# Patient Record
Sex: FEMALE | Race: WHITE
[De-identification: names, ages, dates, MRNs, and addresses within clinical notes are randomized per-mention and may not be internally consistent; named-entity substitution may affect disease eponyms.]

---

## 2017-02-28 ENCOUNTER — HOSPITAL ENCOUNTER (EMERGENCY)
Dept: HOSPITAL 17 - NETRI | Age: 42
Discharge: HOME | End: 2017-02-28
Payer: COMMERCIAL

## 2017-02-28 VITALS
DIASTOLIC BLOOD PRESSURE: 68 MMHG | HEART RATE: 78 BPM | RESPIRATION RATE: 17 BRPM | OXYGEN SATURATION: 96 % | TEMPERATURE: 98 F | SYSTOLIC BLOOD PRESSURE: 147 MMHG

## 2017-02-28 VITALS — WEIGHT: 209.44 LBS | HEIGHT: 62 IN | BODY MASS INDEX: 38.54 KG/M2

## 2017-02-28 DIAGNOSIS — R06.2: ICD-10-CM

## 2017-02-28 DIAGNOSIS — J40: Primary | ICD-10-CM

## 2017-02-28 DIAGNOSIS — M32.9: ICD-10-CM

## 2017-02-28 DIAGNOSIS — J02.9: ICD-10-CM

## 2017-02-28 PROCEDURE — 71020: CPT

## 2017-02-28 PROCEDURE — 99283 EMERGENCY DEPT VISIT LOW MDM: CPT

## 2017-02-28 NOTE — RADRPT
EXAM DATE/TIME:  02/28/2017 13:03 

 

[facility] COMPARISON:     

CHEST PA & LAT, January 12, 2015, 12:19.

 

                     

INDICATIONS :     

Congestion, wheezing, short of breath.

                     

 

MEDICAL HISTORY :     

None.          

 

SURGICAL HISTORY :     

None.   

 

ENCOUNTER:     

Initial                                        

 

ACUITY:     

1 week      

 

PAIN SCORE:     

3/10

 

LOCATION:     

Bilateral chest 

 

FINDINGS:     

PA and lateral views of the chest demonstrate the lungs to be symmetrically aerated without evidence 
of mass, infiltrate or effusion.  The cardiomediastinal contours are unremarkable.  Osseous structure
s are intact.

 

CONCLUSION:     Normal examination.  

 

 

 

 Dread Landry MD on February 28, 2017 at 13:07           

Board Certified Radiologist.

 This report was verified electronically.

## 2017-02-28 NOTE — PD
HPI


Chief Complaint:  Cold / Flu Symptoms


Time Seen by Provider:  12:28


Travel History


International Travel<30 days:  No


Contact w/Intl Traveler<30days:  No


Traveled to known affect area:  No





History of Present Illness


HPI


41-year-old female who reports a history of lupus, hypothyroidism, depression 

and mild asthma.  She presents here for evaluation of cough, congestion, sore 

throat.  Symptoms started 1 week ago.  The cough is productive with green 

sputum production.  She does also endorse some wheezing, particularly at night.

  She is not using any medication for symptom relief.  No recent travel, no 

recent sick contacts.  No fevers or chills.  She has no other complaints at 

this time.





PFSH


Past Medical History


Autoimmune Disease:  Yes (LUPUS)


Heart Rhythm Problems:  No


Cancer:  No


Cardiac Catheterization:  No


Cardiovascular Problems:  Yes (HEART MURMUR)


High Cholesterol:  No


Congestive Heart Failure:  No


Diabetes:  No


Diminished Hearing:  No


Endocrine:  Yes


GERD:  Yes


Genitourinary:  No


Hepatitis:  No


Hiatal Hernia:  No


Hypertension:  No


Immune Disorder:  Yes (LUPUS)


Musculoskeletal:  Yes (HX OF COSTOCHONDRITIS)


Neurologic:  No


Psychiatric:  Yes (ANXIETY AND PANIC ATTACKS. NO MEDS USED)


Reproductive:  No


Respiratory:  Yes (HX OF ASTHMA)


Immunizations Current:  Yes


Thyroid Disease:  Yes (HYPOTHYROID)


Pregnant?:  Not Pregnant


LMP:  2017





Past Surgical History


Abdominal Surgery:  Yes (C- SECTION X 2;)


Body Medical Devices:  N/A


Cardiac Surgery:  No


 Section:  Yes (X2)


Coronary Artery Bypass Graft:  No


Ear Surgery:  No


Endocrine Surgery:  No


Eye Surgery:  No


Genitourinary Surgery:  No


Gynecologic Surgery:  Yes ( X2)


Joint Replacement:  No


Oral Surgery:  Yes (CROWNS)


Pacemaker:  No


Thoracic Surgery:  No





Social History


Alcohol Use:  Yes (SOCIALLY)


Tobacco Use:  No


Substance Use:  No





Allergies-Medications


(Allergen,Severity, Reaction):  


Coded Allergies:  


     Penicillin (Verified  Allergy, Severe, RASH, 17)


Reported Meds & Prescriptions





Reported Meds & Active Scripts


Active


Tessalon Perles (Benzonatate) 100 Mg Cap 100 Mg PO TID PRN


Proair Hfa 8.5 GM Inh (Albuterol Sulfate) 90 Mcg/Act Aer 2 Puff INH Q4-6H PRN


     108 mcg/actuation


Prednisone 20 Mg Tab 20 Mg PO BID 5 Days


Buspirone (Buspirone HCl) 10 Mg Tab 20 Mg PO BID


Zoloft (Sertraline HCl) 50 Mg Tab 50 Mg PO DAILY


Augmentin (Amoxicillin-Clavulanate) 875-125 mg Tab 875 Mg PO BID


     not for use in CrCl <30 ml/min.


Reported


Synthroid (Levothyroxine Sodium) 75 Mcg Tab 75 Mcg PO DAILY


Ibuprofen 800 Mg Tab 800 Mg PO TID








Review of Systems


Except as stated in HPI:  all other systems reviewed are Neg





Physical Exam


Narrative


GENERAL: Well-developed well-nourished female in no acute distress


SKIN: Warm and dry.


HEAD: Atraumatic. Normocephalic. 


EYES: Pupils equal and round. No scleral icterus. No injection or drainage. 


ENT: No nasal bleeding or discharge.  Mucous membranes pink and moist.  No 

oropharyngeal erythema or exudate.  Tympanic membranes revealed normal 

anatomical landmarks without erythema or air fluid level.


NECK: Trachea midline. No JVD.  No lymphadenopathy.


CARDIOVASCULAR: Regular rate and rhythm.  No murmur appreciated.


RESPIRATORY: No accessory muscle use. Clear to auscultation. Breath sounds 

equal bilaterally.  No crackles no wheezing or rhonchi





Data


Data


Last Documented VS





Vital Signs








  Date Time  Temp Pulse Resp B/P Pulse Ox O2 Delivery O2 Flow Rate FiO2


 


17 12:24 98.0 78 17 147/68 96   








Orders





 Chest, Pa & Lat (17 )








[facility]


Medical Decision Making


Medical Screen Exam Complete:  Yes


Emergency Medical Condition:  Yes


Medical Record Reviewed:  Yes


Differential Diagnosis


Bronchitis, sinusitis, pneumonia, influenza, reactive airway disease, asthma 

exacerbation


Narrative Course


41-year-old female with 1 week of cough, congestion, sore throat, wheezing at 

night.  Physical examination currently is reassuring.  She has no fever, 

tachycardia, tachypnea, hypoxia, wheezing on examination.  She appears well.  

She is concerned that she may have pneumonia which she has had in the past.  

Therefore Plan is for chest x-ray.  Chest x-ray is negative for pneumonia.  The 

patient appears to have a viral bronchitis.  She will be treated with 

bronchodilators, prednisone, Tessalon.





Diagnosis





 Primary Impression:  


 Bronchitis





***Additional Instructions:


Medication as prescribed.  Cover mouth when coughing.  Stay well hydrated and 

well-nourished.  Return for any emergent medical conditions.


***Med/Other Pt SpecificInfo:  Prescription(s) given


Scripts


Benzonatate (Tessalon Perles)100 Mg Aff745 Mg PO TID PRN (COUGH) #30 CAP  Ref 0


   Prov:Dread Mann MD         17 


Albuterol 8.5 GM Inh (Proair Hfa 8.5 GM Inh)90 Mcg/Act Aer2 Puff INH Q4-6H PRN (

SHORTNESS OF BREATH) #1 INHALER  Ref 0


   108 mcg/actuation


   Prov:Dread Mann MD         17 


Prednisone 20 Mg Tab20 Mg PO BID  5 Days  Ref 0


   Prov:Dread Mann MD         17


Disposition:  01 DISCHARGE HOME


Condition:  Stable








Jung Frausto 2017 12:36

## 2017-04-25 ENCOUNTER — HOSPITAL ENCOUNTER (EMERGENCY)
Dept: HOSPITAL 17 - NEPK | Age: 42
Discharge: HOME | End: 2017-04-25
Payer: COMMERCIAL

## 2017-04-25 VITALS
OXYGEN SATURATION: 100 % | TEMPERATURE: 98.4 F | DIASTOLIC BLOOD PRESSURE: 88 MMHG | RESPIRATION RATE: 16 BRPM | HEART RATE: 74 BPM | SYSTOLIC BLOOD PRESSURE: 141 MMHG

## 2017-04-25 VITALS — HEIGHT: 67 IN | BODY MASS INDEX: 34.26 KG/M2 | WEIGHT: 218.26 LBS

## 2017-04-25 DIAGNOSIS — S29.012A: ICD-10-CM
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
## 2017-04-25 DIAGNOSIS — S16.1XXA: Primary | ICD-10-CM

## 2017-04-25 PROCEDURE — 99282 EMERGENCY DEPT VISIT SF MDM: CPT

## 2017-04-25 NOTE — PD
HPI


Chief Complaint:  Back/ Neck Pain or Injury


Time Seen by Provider:  21:07


Travel History


International Travel<30 days:  No


Contact w/Intl Traveler<30days:  No


Traveled to known affect area:  No





History of Present Illness


HPI


41-year-old white female presents to emergency Department with complaints of 

neck and upper back pain after a slip and fall at home.  She states that this 

occurred a few days ago.  She had tripped over her daughter's bed falling flat 

onto her back.  She did not hit her head.  There was no loss of consciousness.  

She denies any focal numbness, tingling or weakness.  No acute bowel or bladder 

changes.  She states that she's developed increasing stiffness and pain worse 

with movement of the head.  She states the pain radiates from her base of her 

head down into her neck and upper back.  She has some relief with remaining 

still but worse with activity.  Denies any recent illness.  Denies pregnancy





PFSH


Past Medical History


*** Narrative Medical


Lupus, anxiety, depression, hypothyroidism


Autoimmune Disease:  Yes (LUPUS)


Heart Rhythm Problems:  No


Cancer:  No


Cardiac Catheterization:  No


Cardiovascular Problems:  Yes (HEART MURMUR)


High Cholesterol:  No


Congestive Heart Failure:  No


Diabetes:  No


Diminished Hearing:  No


Endocrine:  Yes


GERD:  Yes


Genitourinary:  No


Hepatitis:  No


Hiatal Hernia:  No


Hypertension:  No


Immune Disorder:  Yes (LUPUS)


Musculoskeletal:  Yes (HX OF COSTOCHONDRITIS)


Neurologic:  No


Psychiatric:  Yes (ANXIETY AND PANIC ATTACKS. NO MEDS USED)


Reproductive:  No


Respiratory:  Yes (HX OF ASTHMA)


Immunizations Current:  Yes


Thyroid Disease:  Yes (HYPOTHYROID)





Past Surgical History


*** Narrative Surgical


C-sections, left carpal tunnel release


Abdominal Surgery:  Yes (C- SECTION X 2;)


Body Medical Devices:  N/A


Cardiac Surgery:  No


 Section:  Yes (X2)


Coronary Artery Bypass Graft:  No


Ear Surgery:  No


Endocrine Surgery:  No


Eye Surgery:  No


Genitourinary Surgery:  No


Gynecologic Surgery:  Yes ( X2)


Joint Replacement:  No


Oral Surgery:  Yes (CROWNS)


Pacemaker:  No


Thoracic Surgery:  No





Social History


Alcohol Use:  Yes (SOCIALLY)


Tobacco Use:  No


Substance Use:  No





Allergies-Medications


(Allergen,Severity, Reaction):  


Coded Allergies:  


     Penicillin (Verified  Allergy, Severe, RASH, 17)


Reported Meds & Prescriptions





Reported Meds & Active Scripts


Active


Robaxin (Methocarbamol) 750 Mg Tab 1,500 Mg PO TID 7 Days


Diclofenac Sodium DR (Diclofenac Sodium) 75 Mg Tabdr 75 Mg PO BID


Proair Hfa 8.5 GM Inh (Albuterol Sulfate) 90 Mcg/Act Aer 2 Puff INH Q4-6H PRN


     108 mcg/actuation


Buspirone (Buspirone HCl) 10 Mg Tab 20 Mg PO BID


Zoloft (Sertraline HCl) 50 Mg Tab 50 Mg PO DAILY


Reported


Omega 3 1000 mg (Omega-3 Fatty Acids) 1 Cap Cap   


Curcumin (Turmeric (Curcuma Longa) (Bulk) 1 Pow Pow   


Vitamin D (Cholecalciferol) 5,000 Unit Tab   


Synthroid (Levothyroxine Sodium) 75 Mcg Tab 75 Mcg PO DAILY


Ibuprofen 800 Mg Tab 800 Mg PO TID








Review of Systems


Except as stated in HPI:  all other systems reviewed are Neg





Physical Exam


Narrative


GENERAL:  Well-developed, well-nourished in no apparent distress. Nontoxic 

appearing.


HEAD: Normocephalic, atraumatic.


EYES: Pupils equal round and reactive. Extraocular motions intact. No scleral 

icterus. No injection or drainage. 


ENT: Nose clear. Throat without erythema, tonsillar hypertrophy or exudate. 

Uvula midline. Airway patent.


NECK: Trachea midline. Supple, paraspinal muscle tenderness but no central bony 

tenderness., patient moves head slowly due to pain.  She has slightly limited 

range of motion..  Mild spasm.


CARDIOVASCULAR: Regular rate and rhythm without murmurs, gallops, or rubs. 


RESPIRATORY: Clear to auscultation. Breath sounds equal bilaterally. No wheezes

, rales, or rhonchi.  


GASTROINTESTINAL: Abdomen soft, non-tender, nondistended. No hepato-splenomegaly

, or palpable masses. No guarding.


EXTREMITIES: No clubbing, cyanosis, or edema. No joint tenderness.


BACK: No central bony tenderness.  Without deformity. No flank tenderness.  

Patient complains of myofascial tenderness between the scapulas bilaterally.  

She has parathoracic muscle tenderness with mild spasm.  She has no pain in the 

lower lumbar spine.  Full range of motion.  Neurovascularly intact distally.  

No saddle anesthesia.


NEUROLOGICAL: Awake, alert and oriented x 3 .Cranial nerves grossly intact.  

Motor and sensory grossly within normal limits. Normal speech.





Data


Data


Last Documented VS





Vital Signs








  Date Time  Temp Pulse Resp B/P Pulse Ox O2 Delivery O2 Flow Rate FiO2


 


17 20:45 98.4 74 16 141/88 100   











MDM


Medical Decision Making


Medical Screen Exam Complete:  Yes


Emergency Medical Condition:  Yes


Medical Record Reviewed:  Yes


Differential Diagnosis


MDM: High


Differential diagnoses: Fracture, sprain, strain, dislocation, contusion, 

neurovascular injury


Narrative Course


Patient's exam is reassuring.  I do not believe that imaging is indicated at 

this time.  The patient will be treated with muscle relaxer and NSAID.





This is acute cervical and thoracic strain status post fall





Diagnosis





 Primary Impression:  


 acute cervical and thoracic strain status post fall


Patient Instructions:  General Instructions





***Additional Instructions:


Rest.


Ice or heat whichever seems to help her pain the past.


Robaxin and Voltaren.


Follow-up with a primary care doctor in one week.


Return to the ER for emergencies.


***Med/Other Pt SpecificInfo:  Prescription(s) given


Scripts


Methocarbamol (Robaxin)750 Mg Tab1,500 Mg PO TID  7 Days


   Prov:Arturo Lafleur MD         17 


Diclofenac Sodium DR 75 Mg Tabdr75 Mg PO BID  #20 TAB


   Prov:Arturo Lafleur MD         17


Disposition:  01 DISCHARGE HOME


Condition:  Stable








En Vasquez 2017 21:12

## 2017-05-07 ENCOUNTER — HOSPITAL ENCOUNTER (EMERGENCY)
Dept: HOSPITAL 17 - PHED | Age: 42
LOS: 1 days | Discharge: HOME | End: 2017-05-08
Payer: COMMERCIAL

## 2017-05-07 VITALS — HEIGHT: 62 IN | WEIGHT: 211.42 LBS | BODY MASS INDEX: 38.91 KG/M2

## 2017-05-07 DIAGNOSIS — J32.9: ICD-10-CM

## 2017-05-07 DIAGNOSIS — M32.9: ICD-10-CM

## 2017-05-07 DIAGNOSIS — E03.9: ICD-10-CM

## 2017-05-07 DIAGNOSIS — H10.9: Primary | ICD-10-CM

## 2017-05-07 PROCEDURE — 99283 EMERGENCY DEPT VISIT LOW MDM: CPT

## 2017-05-08 VITALS
TEMPERATURE: 98 F | SYSTOLIC BLOOD PRESSURE: 122 MMHG | RESPIRATION RATE: 14 BRPM | HEART RATE: 71 BPM | OXYGEN SATURATION: 97 % | DIASTOLIC BLOOD PRESSURE: 87 MMHG

## 2017-05-08 NOTE — PD
HPI


Chief Complaint:  Cold / Flu Symptoms


Time Seen by Provider:  00:23


Travel History


International Travel<30 days:  No


Contact w/Intl Traveler<30days:  No


Traveled to known affect area:  No





History of Present Illness


HPI


42-year-old woman presents to the emergency department complaining of increased 

sinus pressure over the past 3 days, cough productive of thick phlegm, now with 

right eye irritation and inflammation redness purulent drainage and fullness.  

She has pain behind the eye, and fullness around the eye.  No fevers or chills.

  She is a history of lupus but is not on any medication for now.  She 

otherwise has been feeling generally well and healthy.





History


Past Medical History


*** Narrative Medical


Lupus


Hypothyroidism


Influenza Vaccination:  No


LMP:  17


:  2


Para:  2





Social History


Alcohol Use:  Yes (SOCIALLY)


Tobacco Use:  No





Allergies-Medications


(Allergen,Severity, Reaction):  


Coded Allergies:  


     Penicillin (Verified  Allergy, Severe, RASH, 17)


Reported Meds & Prescriptions





Reported Meds & Active Scripts


Active


Proair Hfa 8.5 GM Inh (Albuterol Sulfate) 90 Mcg/Act Aer 2 Puff INH Q4-6H PRN


     108 mcg/actuation


Buspirone (Buspirone HCl) 10 Mg Tab 20 Mg PO BID


Zoloft (Sertraline HCl) 50 Mg Tab 50 Mg PO DAILY


Reported


Synthroid (Levothyroxine Sodium) 75 Mcg Tab 75 Mcg PO DAILY








Review of Systems


Except as stated in HPI:  all other systems reviewed are Neg





Physical Exam


Narrative


GENERAL: Well-appearing 42-year-old woman, no acute distress.


SKIN: Focused skin assessment warm/dry.


HEAD: Atraumatic. Normocephalic. 


EYES: Right eye is matted shut with purulent drainage, there is erythema and 

injection of the eye itself.  There is a little bit of photophobia but no 

consensual photophobia.  I don't see any hyphema.  Woods lamp exam was negative 

for any abnormal areas of uptake.  Intraocular pressures were about 17-20 in 

the right, 13 in the left.


ENT: No nasal bleeding or discharge.  Mucous membranes pink and moist.  TMs 

normal.  Throat normal.


NECK: Trachea midline. No JVD.  No meningismus.


CARDIOVASCULAR: Regular rate and rhythm.  No murmur appreciated.


RESPIRATORY: No accessory muscle use. Clear to auscultation. Breath sounds 

equal bilaterally. 


GASTROINTESTINAL: Abdomen soft, non-tender, nondistended. Hepatic and splenic 

margins not palpable. 


MUSCULOSKELETAL: No obvious deformities. No clubbing.  No cyanosis.  No edema. 


NEUROLOGICAL: Awake and alert. No obvious cranial nerve deficits.  Motor 

grossly within normal limits. Normal speech.


PSYCHIATRIC: Appropriate mood and affect; insight and judgment normal.





Data


Data


Last Documented VS





Vital Signs








  Date Time  Temp Pulse Resp B/P Pulse Ox O2 Delivery O2 Flow Rate FiO2


 


17 00:01 98.0 71 14 122/87 97   








Orders





 Proparacaine 0.5% Opth Soln (Alcaine 0.5 (17 00:45)


Erythromycin 0.5% Opth Oint (Ilotycin 0. (17 01:15)


Clindamycin (Cleocin) (17 01:15)








[facility]


Medical Decision Making


Medical Screen Exam Complete:  Yes


Emergency Medical Condition:  Yes


Differential Diagnosis


Sinusitis, orbital cellulitis, retro-orbital cellulitis, dural vein thrombosis, 

iritis or anterior uveitis, other


Narrative Course


Medical decision-making 





42-year-old woman with sinus pressure and conjunctivitis symptoms.  She is a 

little bit more photophobia and pain in the abdomen or expected for straight 

for conjunctivitis.  Don't see any other evidence of iritis or more sinister 

eye disease.  I don't think she has retro-orbital cellulitis.  Recommend oral 

antibiotics, antibiotic ointment, and return for any worsening symptoms.  She 

is agreeable.





Diagnosis





 Primary Impression:  


 Sinusitis


 Additional Impression:  


 Conjunctivitis





***Additional Instructions:


Continue clindamycin as prescribed for sinusitis.





Continue with him eyes normal as prescribed first conjunctivitis.





Return the emergency department for any worsening eye pain, swelling, fevers, 

or any other new or worsening symptoms.


***Med/Other Pt SpecificInfo:  Prescription(s) given


Scripts


Erythromycin Opth Oint 5 Mg/Gm Oint1 Applic RIGHT EYE QID  #1 TUBE


   Prov:Dread Mann MD         17 


Clindamycin 300 Mg [id number] Mg PO Q6H  10 Days


   Prov:Dread Mann MD         17


Disposition:  01 DISCHARGE HOME


Condition:  Stable








Dread Mann MD May 8, 2017 01:19

## 2017-05-29 ENCOUNTER — HOSPITAL ENCOUNTER (EMERGENCY)
Dept: HOSPITAL 17 - PHED | Age: 42
LOS: 1 days | Discharge: HOME | End: 2017-05-30
Payer: COMMERCIAL

## 2017-05-29 VITALS
DIASTOLIC BLOOD PRESSURE: 79 MMHG | OXYGEN SATURATION: 95 % | HEART RATE: 80 BPM | RESPIRATION RATE: 16 BRPM | SYSTOLIC BLOOD PRESSURE: 132 MMHG

## 2017-05-29 VITALS — WEIGHT: 215.39 LBS | HEIGHT: 62 IN | BODY MASS INDEX: 39.64 KG/M2

## 2017-05-29 VITALS
TEMPERATURE: 98.3 F | SYSTOLIC BLOOD PRESSURE: 143 MMHG | DIASTOLIC BLOOD PRESSURE: 88 MMHG | OXYGEN SATURATION: 98 % | RESPIRATION RATE: 14 BRPM | HEART RATE: 77 BPM

## 2017-05-29 VITALS
RESPIRATION RATE: 14 BRPM | DIASTOLIC BLOOD PRESSURE: 88 MMHG | OXYGEN SATURATION: 98 % | HEART RATE: 77 BPM | TEMPERATURE: 98.3 F | SYSTOLIC BLOOD PRESSURE: 143 MMHG

## 2017-05-29 DIAGNOSIS — E03.9: ICD-10-CM

## 2017-05-29 DIAGNOSIS — F41.9: ICD-10-CM

## 2017-05-29 DIAGNOSIS — J40: Primary | ICD-10-CM

## 2017-05-29 DIAGNOSIS — J45.909: ICD-10-CM

## 2017-05-29 DIAGNOSIS — Z88.0: ICD-10-CM

## 2017-05-29 DIAGNOSIS — F32.9: ICD-10-CM

## 2017-05-29 DIAGNOSIS — M32.9: ICD-10-CM

## 2017-05-29 DIAGNOSIS — Z79.899: ICD-10-CM

## 2017-05-29 DIAGNOSIS — K21.9: ICD-10-CM

## 2017-05-29 PROCEDURE — 94664 DEMO&/EVAL PT USE INHALER: CPT

## 2017-05-29 PROCEDURE — 99284 EMERGENCY DEPT VISIT MOD MDM: CPT

## 2017-05-29 PROCEDURE — 94640 AIRWAY INHALATION TREATMENT: CPT

## 2017-05-29 NOTE — PD
HPI


Chief Complaint:  Cold / Flu Symptoms


Time Seen by Provider:  23:55


Travel History


International Travel<30 days:  No


Contact w/Intl Traveler<30days:  No


Traveled to known affect area:  No





History of Present Illness


HPI


The patient is a 42-year-old  female who presents emergency department 

for cough and cold symptoms.  The patient states she recently moved into an 

apartment that she thinks has mold.  The patient complains of chest tightness, 

wheezing, and it productive cough producing occasional white to green sputum.  

The patient was diagnosed with sinusitis several weeks ago, was placed on 

doxycycline, however, had subsequent nausea and vomiting secondary to the 

doxycycline.  The patient's antibiotics were changed to Zithromax and her 

symptoms improved.  However, the patient is now having recurrent symptoms 

including Dr. cough, wheezing, and chest tightness.  She denies any fever, 

chills, or sweats.  The patient does have a history of lupus, is not currently 

on her Plaquenil.  Symptoms are moderate, possibly exacerbated by mold, and 

there are no current alleviating factors.





PFSH


Past Medical History


Asthma:  Yes


Autoimmune Disease:  Yes (LUPUS)


Anxiety:  Yes


Depression:  Yes


Heart Rhythm Problems:  No


Cancer:  No


Cardiac Catheterization:  No


Cardiovascular Problems:  Yes (HEART MURMUR)


High Cholesterol:  No


Congestive Heart Failure:  No


Diabetes:  No


Diminished Hearing:  No


Endocrine:  Yes


GERD:  Yes


Genitourinary:  No


Hepatitis:  No


Hiatal Hernia:  No


Hypertension:  No


Immune Disorder:  Yes (LUPUS)


Musculoskeletal:  Yes (HX OF COSTOCHONDRITIS)


Neurologic:  No


Psychiatric:  Yes (ANXIETY AND PANIC ATTACKS. NO MEDS USED)


Reproductive:  No


Respiratory:  Yes (HX OF ASTHMA)


Immunizations Current:  Yes


Thyroid Disease:  Yes (HYPOTHYROID)


Pregnant?:  Not Pregnant


:  2


Para:  2





Past Surgical History


Abdominal Surgery:  Yes


Body Medical Devices:  N/A


Cardiac Surgery:  No


 Section:  Yes (X2)


Coronary Artery Bypass Graft:  No


Ear Surgery:  No


Endocrine Surgery:  No


Eye Surgery:  No


Genitourinary Surgery:  No


Gynecologic Surgery:  Yes ( X2)


Joint Replacement:  No


Oral Surgery:  Yes (CROWNS)


Pacemaker:  No


Thoracic Surgery:  No





Social History


Alcohol Use:  Yes (SOCIALLY)


Tobacco Use:  No


Substance Use:  No





Allergies-Medications


(Allergen,Severity, Reaction):  


Coded Allergies:  


     Penicillin (Verified  Allergy, Severe, RASH, 5/30/17)


Reported Meds & Prescriptions





Reported Meds & Active Scripts


Active


Zoloft (Sertraline HCl) 50 Mg Tab 50 Mg PO DAILY


Erythromycin Opth Oint 5 Mg/Gm Oint 1 Applic RIGHT EYE QID


Clindamycin (Clindamycin HCl) 300 Mg Cap 300 Mg PO Q6H 10 Days


Proair Hfa 8.5 GM Inh (Albuterol Sulfate) 90 Mcg/Act Aer 2 Puff INH Q4-6H PRN


     108 mcg/actuation


Buspirone (Buspirone HCl) 10 Mg Tab 20 Mg PO BID


Reported


Synthroid (Levothyroxine Sodium) 75 Mcg Tab 75 Mcg PO DAILY








Review of Systems


Except as stated in HPI:  all other systems reviewed are Neg


General / Constitutional:  No: Fever


HENT:  No: Lightheadedness, Sore Throat


Cardiovascular:  No: Chest Pain or Discomfort


Respiratory:  Positive: Cough, Wheezing


Gastrointestinal:  No: Nausea, Vomiting


Musculoskeletal:  No: Myalgias, Arthralgias





Physical Exam


Narrative


GENERAL: Awake, alert, pleasant 42-year-old female who appears her stated age 

is in no acute respiratory distress.


SKIN: Focused skin assessment warm/dry.


HEAD: Atraumatic. Normocephalic. 


EYES: Pupils equal and round. No scleral icterus. No injection or drainage. 


ENT: No nasal bleeding or discharge.  Mucous membranes pink and moist.  

Cobblestoning in posterior oropharynx without any exudate.


NECK: Trachea midline. No JVD. 


CARDIOVASCULAR: Regular rate and rhythm.  No murmur appreciated.


RESPIRATORY: No accessory muscle use.  Scattered wheezes.


MUSCULOSKELETAL: No obvious deformities. No clubbing.  No cyanosis.  No edema. 


NEUROLOGICAL: Awake and alert. No obvious cranial nerve deficits.  Motor 

grossly within normal limits. Normal speech.


PSYCHIATRIC: Appropriate mood and affect; insight and judgment normal.





Data


Data


Last Documented VS





Vital Signs








  Date Time  Temp Pulse Resp B/P Pulse Ox O2 Delivery O2 Flow Rate FiO2


 


17 23:57   16  95 Room Air  


 


17 23:57  80  132/79    


 


17 23:52 98.3       








Orders





 Ecg Monitoring (17 23:56)


Oximetry (17 23:56)


Albuterol-Ipratropium Neb (Duoneb Neb) (17 00:00)


Lidocaine Pf 4% Neb (Lidocaine Pf 4% Neb (17 00:00)


Prednisone (Deltasone) (17 00:00)








MDM


Medical Decision Making


Medical Screen Exam Complete:  Yes


Emergency Medical Condition:  Yes


Medical Record Reviewed:  Yes


Differential Diagnosis


Differential diagnosis includes postnasal drainage, bronchitis, reactive airway 

disease, pneumonia, URI, viral syndrome, allergic reaction.


Narrative Course


The patient was administered prednisone 50 mg orally and duo nebs 2 with 

respiratory lidocaine.  The patient was reevaluated at 12:45 AM, her symptoms 

had significantly improved.  The patient most likely has an allergic versus 

viral bronchitis, will be placed on prednisone and an albuterol inhaler.  The 

patient finished Zithromax 2 weeks ago for sinusitis, I doubt mycoplasma 

pneumonia.  She is advised to follow-up with her primary physician and/or 

pulmonologist if symptoms persist.  Patient is stable for outpatient follow-up.





Diagnosis





 Primary Impression:  


 Bronchitis


Patient Instructions:  General Instructions





***Additional Instructions:


Medications as directed.  Follow-up with her primary physician.  Return if 

symptoms worsen or progress.  Use albuterol inhaler every 4-6 hours while awake 

for the next 4-5 days.


***Med/Other Pt SpecificInfo:  Prescription(s) given


Scripts


Albuterol 18 GM Inh (Ventolin Hfa 18 GM Inh)90 Mcg/Act Aer2 Puff INH Q6H PRN (

SHORTNESS OF BREATH) #1 INHALER  Ref 0


   Prov:Kurt Stratton MD         17 


Prednisone (Deltasone)20 Mg Tab40 Mg PO DAILY  4 Days  Ref 0


   Prov:Kurt Stratton MD         17


Disposition:  01 DISCHARGE HOME


Condition:  Stable








Kurt Stratton MD May 29, 2017 23:59

## 2017-05-30 RX ADMIN — IPRATROPIUM BROMIDE AND ALBUTEROL SULFATE SCH AMPULE: .5; 3 SOLUTION RESPIRATORY (INHALATION) at 00:12

## 2017-05-30 RX ADMIN — IPRATROPIUM BROMIDE AND ALBUTEROL SULFATE SCH AMPULE: .5; 3 SOLUTION RESPIRATORY (INHALATION) at 00:20

## 2017-10-21 ENCOUNTER — HOSPITAL ENCOUNTER (EMERGENCY)
Dept: HOSPITAL 17 - PHED | Age: 42
LOS: 1 days | Discharge: HOME | End: 2017-10-22
Payer: COMMERCIAL

## 2017-10-21 VITALS — WEIGHT: 212.97 LBS | HEIGHT: 62 IN | BODY MASS INDEX: 39.19 KG/M2

## 2017-10-21 VITALS
TEMPERATURE: 99.8 F | HEART RATE: 93 BPM | SYSTOLIC BLOOD PRESSURE: 150 MMHG | OXYGEN SATURATION: 98 % | RESPIRATION RATE: 18 BRPM | DIASTOLIC BLOOD PRESSURE: 81 MMHG

## 2017-10-21 DIAGNOSIS — Z98.890: ICD-10-CM

## 2017-10-21 DIAGNOSIS — E03.9: ICD-10-CM

## 2017-10-21 DIAGNOSIS — Z86.2: ICD-10-CM

## 2017-10-21 DIAGNOSIS — R10.2: ICD-10-CM

## 2017-10-21 DIAGNOSIS — N89.8: ICD-10-CM

## 2017-10-21 DIAGNOSIS — Z87.09: ICD-10-CM

## 2017-10-21 DIAGNOSIS — N71.9: Primary | ICD-10-CM

## 2017-10-21 DIAGNOSIS — Z87.39: ICD-10-CM

## 2017-10-21 DIAGNOSIS — Z87.19: ICD-10-CM

## 2017-10-21 DIAGNOSIS — R11.0: ICD-10-CM

## 2017-10-21 DIAGNOSIS — Z86.59: ICD-10-CM

## 2017-10-21 DIAGNOSIS — Z86.79: ICD-10-CM

## 2017-10-21 PROCEDURE — 87591 N.GONORRHOEAE DNA AMP PROB: CPT

## 2017-10-21 PROCEDURE — 85025 COMPLETE CBC W/AUTO DIFF WBC: CPT

## 2017-10-21 PROCEDURE — 99284 EMERGENCY DEPT VISIT MOD MDM: CPT

## 2017-10-21 PROCEDURE — 87210 SMEAR WET MOUNT SALINE/INK: CPT

## 2017-10-21 PROCEDURE — 80048 BASIC METABOLIC PNL TOTAL CA: CPT

## 2017-10-21 PROCEDURE — 96365 THER/PROPH/DIAG IV INF INIT: CPT

## 2017-10-21 PROCEDURE — 87491 CHLMYD TRACH DNA AMP PROBE: CPT

## 2017-10-22 ENCOUNTER — HOSPITAL ENCOUNTER (EMERGENCY)
Dept: HOSPITAL 17 - NEPD | Age: 42
Discharge: HOME | End: 2017-10-22
Payer: COMMERCIAL

## 2017-10-22 VITALS — SYSTOLIC BLOOD PRESSURE: 122 MMHG | DIASTOLIC BLOOD PRESSURE: 60 MMHG

## 2017-10-22 VITALS
OXYGEN SATURATION: 97 % | SYSTOLIC BLOOD PRESSURE: 143 MMHG | TEMPERATURE: 98.6 F | DIASTOLIC BLOOD PRESSURE: 78 MMHG | RESPIRATION RATE: 14 BRPM | HEART RATE: 88 BPM

## 2017-10-22 VITALS
RESPIRATION RATE: 18 BRPM | TEMPERATURE: 99.8 F | OXYGEN SATURATION: 98 % | DIASTOLIC BLOOD PRESSURE: 81 MMHG | HEART RATE: 93 BPM | SYSTOLIC BLOOD PRESSURE: 150 MMHG

## 2017-10-22 VITALS
OXYGEN SATURATION: 98 % | SYSTOLIC BLOOD PRESSURE: 141 MMHG | TEMPERATURE: 98.1 F | RESPIRATION RATE: 16 BRPM | HEART RATE: 75 BPM | DIASTOLIC BLOOD PRESSURE: 85 MMHG

## 2017-10-22 VITALS — WEIGHT: 205.03 LBS | HEIGHT: 62 IN | BODY MASS INDEX: 37.73 KG/M2

## 2017-10-22 DIAGNOSIS — E03.9: ICD-10-CM

## 2017-10-22 DIAGNOSIS — Z87.39: ICD-10-CM

## 2017-10-22 DIAGNOSIS — Z87.09: ICD-10-CM

## 2017-10-22 DIAGNOSIS — Z86.2: ICD-10-CM

## 2017-10-22 DIAGNOSIS — Z98.890: ICD-10-CM

## 2017-10-22 DIAGNOSIS — N71.9: Primary | ICD-10-CM

## 2017-10-22 DIAGNOSIS — Z86.79: ICD-10-CM

## 2017-10-22 DIAGNOSIS — R50.9: ICD-10-CM

## 2017-10-22 DIAGNOSIS — Z87.19: ICD-10-CM

## 2017-10-22 DIAGNOSIS — Z86.59: ICD-10-CM

## 2017-10-22 DIAGNOSIS — M79.1: ICD-10-CM

## 2017-10-22 LAB
ALBUMIN SERPL-MCNC: 3.5 GM/DL (ref 3.4–5)
ALP SERPL-CCNC: 84 U/L (ref 45–117)
ALT SERPL-CCNC: 26 U/L (ref 10–53)
AMORPHOUS SEDIMENT, URINE: (no result)
ANION GAP SERPL CALC-SCNC: 7 MEQ/L (ref 5–15)
AST SERPL-CCNC: 26 U/L (ref 15–37)
BACTERIA #/AREA URNS HPF: (no result) /HPF
BASOPHILS # BLD AUTO: 0.1 TH/MM3 (ref 0–0.2)
BASOPHILS # BLD AUTO: 0.2 TH/MM3 (ref 0–0.2)
BASOPHILS NFR BLD: 0.4 % (ref 0–2)
BASOPHILS NFR BLD: 0.9 % (ref 0–2)
BILIRUB SERPL-MCNC: 0.7 MG/DL (ref 0.2–1)
BUN SERPL-MCNC: 12 MG/DL (ref 7–18)
BUN SERPL-MCNC: 6 MG/DL (ref 7–18)
CALCIUM SERPL-MCNC: 8.9 MG/DL (ref 8.5–10.1)
CHLAMYDIA PCR: NOT DETECTED
CHLORIDE SERPL-SCNC: 102 MEQ/L (ref 98–107)
CHLORIDE SERPL-SCNC: 102 MEQ/L (ref 98–107)
COLOR UR: YELLOW
CREAT SERPL-MCNC: 0.69 MG/DL (ref 0.5–1)
EOSINOPHIL # BLD: 0.3 TH/MM3 (ref 0–0.4)
EOSINOPHIL # BLD: 0.4 TH/MM3 (ref 0–0.4)
EOSINOPHIL NFR BLD: 1.6 % (ref 0–4)
EOSINOPHIL NFR BLD: 2.5 % (ref 0–4)
ERYTHROCYTE [DISTWIDTH] IN BLOOD BY AUTOMATED COUNT: 12.6 % (ref 11.6–17.2)
ERYTHROCYTE [DISTWIDTH] IN BLOOD BY AUTOMATED COUNT: 13.6 % (ref 11.6–17.2)
GFR SERPLBLD BASED ON 1.73 SQ M-ARVRAT: 84 ML/MIN (ref 89–?)
GFR SERPLBLD BASED ON 1.73 SQ M-ARVRAT: 94 ML/MIN (ref 89–?)
GLUCOSE SERPL-MCNC: 85 MG/DL (ref 74–106)
GLUCOSE UR STRIP-MCNC: (no result) MG/DL
HCO3 BLD-SCNC: 27.4 MEQ/L (ref 21–32)
HCO3 BLD-SCNC: 28.1 MEQ/L (ref 21–32)
HCT VFR BLD CALC: 38.6 % (ref 35–46)
HCT VFR BLD CALC: 40.1 % (ref 35–46)
HEMO FLAGS: (no result)
HGB BLD-MCNC: 13.5 GM/DL (ref 11.6–15.3)
HGB UR QL STRIP: (no result)
KETONES UR STRIP-MCNC: (no result) MG/DL
LYMPHOCYTES # BLD AUTO: 1.7 TH/MM3 (ref 1–4.8)
LYMPHOCYTES # BLD AUTO: 1.8 TH/MM3 (ref 1–4.8)
LYMPHOCYTES NFR BLD AUTO: 11.1 % (ref 9–44)
LYMPHOCYTES NFR BLD AUTO: 8.7 % (ref 9–44)
MCH RBC QN AUTO: 29 PG (ref 27–34)
MCH RBC QN AUTO: 29.6 PG (ref 27–34)
MCHC RBC AUTO-ENTMCNC: 33.6 % (ref 32–36)
MCHC RBC AUTO-ENTMCNC: 34.1 % (ref 32–36)
MCV RBC AUTO: 84.9 FL (ref 80–100)
MCV RBC AUTO: 87.9 FL (ref 80–100)
MONOCYTE #: 1.2 TH/MM3 (ref 0–0.9)
MONOCYTES NFR BLD: 5.5 % (ref 0–8)
MONOCYTES NFR BLD: 7.1 % (ref 0–8)
NEISSERIA PCR: NOT DETECTED
NEUTROPHILS # BLD AUTO: 12.8 TH/MM3 (ref 1.8–7.7)
NEUTROPHILS # BLD AUTO: 15.9 TH/MM3 (ref 1.8–7.7)
NEUTROPHILS NFR BLD AUTO: 78.9 % (ref 16–70)
NEUTROPHILS NFR BLD AUTO: 83.3 % (ref 16–70)
NITRITE UR QL STRIP: (no result)
PLATELET # BLD: 273 TH/MM3 (ref 150–450)
PLATELET # BLD: 307 TH/MM3 (ref 150–450)
PMV BLD AUTO: 8 FL (ref 7–11)
POTASSIUM SERPL-SCNC: 3.6 MEQ/L (ref 3.5–5.1)
PROT SERPL-MCNC: 7.5 GM/DL (ref 6.4–8.2)
RBC # BLD AUTO: 4.54 MIL/MM3 (ref 4–5.3)
RBC # BLD AUTO: 4.56 MIL/MM3 (ref 4–5.3)
SCAN/DIFF: (no result)
SODIUM SERPL-SCNC: 136 MEQ/L (ref 136–145)
SODIUM SERPL-SCNC: 136 MEQ/L (ref 136–145)
SP GR UR STRIP: 1.01 (ref 1–1.03)
SQUAMOUS #/AREA URNS HPF: 1 /HPF (ref 0–5)
URINE LEUKOCYTE ESTERASE: (no result)
WBC # BLD AUTO: 16.2 TH/MM3 (ref 4–11)
WBC # BLD AUTO: 19.2 TH/MM3 (ref 4–11)

## 2017-10-22 PROCEDURE — 96374 THER/PROPH/DIAG INJ IV PUSH: CPT

## 2017-10-22 PROCEDURE — 99285 EMERGENCY DEPT VISIT HI MDM: CPT

## 2017-10-22 PROCEDURE — 76830 TRANSVAGINAL US NON-OB: CPT

## 2017-10-22 PROCEDURE — 76856 US EXAM PELVIC COMPLETE: CPT

## 2017-10-22 PROCEDURE — 85025 COMPLETE CBC W/AUTO DIFF WBC: CPT

## 2017-10-22 PROCEDURE — 83605 ASSAY OF LACTIC ACID: CPT

## 2017-10-22 PROCEDURE — 80053 COMPREHEN METABOLIC PANEL: CPT

## 2017-10-22 PROCEDURE — 96361 HYDRATE IV INFUSION ADD-ON: CPT

## 2017-10-22 PROCEDURE — 87040 BLOOD CULTURE FOR BACTERIA: CPT

## 2017-10-22 PROCEDURE — 81001 URINALYSIS AUTO W/SCOPE: CPT

## 2017-10-22 NOTE — PD
HPI


Chief Complaint:  Gyn Problem/Complaint


Time Seen by Provider:  19:54


Travel History


International Travel<30 days:  No


Contact w/Intl Traveler<30days:  No


Traveled to known affect area:  No





History of Present Illness


HPI


42-year-old female presents to the emergency department for evaluation of 

pelvic pain, generalized pain.  Patient is a G2, P2 that had an ablation of the 

uterine endometrium for vaginal bleeding done on  of this month by Dr. Barraza.  According to previous notes, it was an extensive ablation.  She 

followed up with Dr. Barraza on Monday and was doing well.  However, since then 

she started with generalized body aches, pelvic pain, fevers.  Patient was seen 

in [place] emergency department last night and was discharged home with 

prescription for doxycycline, Flagyl, Zofran for endometritis.  She had lab 

work and pelvic exam completed at that time.  Labs did show leukocytosis of 

19.2.  Wet prep was negative.  GC and chlamydia are still pending.  Patient was 

instructed to return if she has any worsening symptoms.  She denies any fever 

since being seen last night.  However, she started with worsening pelvic pain.  

Patient also reports history of lupus.  She is not on any other medications 

other than the antibiotics prescribed to her last night.





PFSH


Past Medical History


Asthma:  Yes


Autoimmune Disease:  Yes (LUPUS)


Anxiety:  Yes


Depression:  Yes


Heart Rhythm Problems:  No


Cancer:  No


Cardiac Catheterization:  No


Cardiovascular Problems:  Yes (HEART MURMUR)


High Cholesterol:  No


Congestive Heart Failure:  No


Diabetes:  No


Diminished Hearing:  No


Endocrine:  Yes


Gastrointestinal Disorders:  No


GERD:  Yes


Genitourinary:  No


Hepatitis:  No


Hiatal Hernia:  No


Heparin Induced Thrombocytopen:  No


Hypertension:  No


Immune Disorder:  Yes (LUPUS)


Medical other:  Yes (DIVERTICULOSIS)


Musculoskeletal:  Yes (HX OF COSTOCHONDRITIS)


Neurologic:  No


Psychiatric:  Yes (ANXIETY AND PANIC ATTACKS. NO MEDS USED)


Reproductive:  No


Respiratory:  Yes


Immunizations Current:  Yes


Thyroid Disease:  Yes (HYPOTHYROID)


Pregnant?:  Not Pregnant


:  2


Para:  2





Past Surgical History


Abdominal Surgery:  Yes


Body Medical Devices:  N/A


Cardiac Surgery:  No


 Section:  Yes (X2)


Coronary Artery Bypass Graft:  No


Ear Surgery:  No


Endocrine Surgery:  No


Eye Surgery:  No


Genitourinary Surgery:  No


Gynecologic Surgery:  Yes (UTERINE ABLATION)


Joint Replacement:  No


Neurologic Surgery:  No


Oral Surgery:  Yes (CROWNS)


Pacemaker:  No


Thoracic Surgery:  No


Other Surgery:  Yes





Social History


Alcohol Use:  Yes (Socially)


Tobacco Use:  No


Substance Use:  No





Allergies-Medications


(Allergen,Severity, Reaction):  


Coded Allergies:  


     penicillin G (Verified  Allergy, Severe, RASH, 10/22/17)


Reported Meds & Prescriptions





Reported Meds & Active Scripts


Active


Flagyl (Metronidazole) 500 Mg Tab 500 Mg PO TID 10 Days


Zofran (Ondansetron HCl) 8 Mg Tab 8 Mg PO TID


Doxycycline Hyclate 100 Mg Cap 100 Mg PO BID


Reported


Levothyroxine (Levothyroxine Sodium) 88 Mcg Tab 88 Mcg PO DAILY


Sertraline (Sertraline HCl) 50 Mg Tab 75 Mg PO DAILY








Review of Systems


Except as stated in HPI:  all other systems reviewed are Neg





Physical Exam


Narrative


GENERAL: Well-nourished, well-developed female patient, ambulatory.  Afebrile.


SKIN: Focused skin assessment warm/dry.


HEAD: Normocephalic.  Atraumatic


EYES: No scleral icterus. No injection or drainage. 


NECK: Supple, trachea midline. No JVD or lymphadenopathy.


CARDIOVASCULAR: Regular rate and rhythm without murmurs, gallops, or rubs. 


RESPIRATORY: Breath sounds equal bilaterally. No accessory muscle use.  Lungs 

sounds are clear to auscultation.


GASTROINTESTINAL: Abdomen soft and nondistended.  Patient has pelvic tenderness 

to palpation.


MUSCULOSKELETAL: No cyanosis, or edema. 


BACK: Nontender without obvious deformity. No CVA tenderness.





Data


Data


Last Documented VS





Vital Signs








  Date Time  Temp Pulse Resp B/P (MAP) Pulse Ox O2 Delivery O2 Flow Rate FiO2


 


10/22/17 19:19 98.1 75 16 141/85 (103) 98 Room Air  








Orders





 Orders


Complete Blood Count With Diff (10/22/17 19:56)


Comprehensive Metabolic Panel (10/22/17 19:56)


Urinalysis - C+S If Indicated (10/22/17 19:56)


Us Pelvis Comp Gyn/Non-Preg (10/22/17 )


Iv Access Insert/Monitor (10/22/17 19:56)


Sodium Chlor 0.9% 1000 Ml Inj (Ns 1000 M (10/22/17 19:56)


Blood Culture (10/22/17 19:56)


Lactic Acid Sepsis Protocol (10/22/17 19:56)





Labs





Laboratory Tests








Test


  10/22/17


20:25











[facility]


Medical Decision Making


Medical Screen Exam Complete:  Yes


Emergency Medical Condition:  Yes


Medical Record Reviewed:  Yes


Differential Diagnosis


Endometritis ice versus sepsis versus UTI versus PID versus unlikely pelvic 

abscess


Narrative Course


42-year-old female presents to the emergency department for worsening pelvic 

pain since being seen last night.  CBC, CMP, UA, lactic acid, blood cultures 2 

are ordered and pending.  Pelvic ultrasound is ordered and pending.





My attending physician, Dr. Yun, will resume care and disposition of patient.











Amanda Buchanan Oct 22, 2017 20:16

## 2017-10-22 NOTE — RADRPT
EXAM DATE/TIME:  10/22/2017 20:13 

 

[facility] COMPARISON:     

No previous studies available for comparison.

        

 

 

INDICATIONS :     

Pelvic pain. 

                     

 

MEDICAL HISTORY :     

Hypothyroidism. Gastroesophageal reflux disease.   Diverticulosis. Asthma. Depression. Anxiety. Glass
es. 

 

SURGICAL HISTORY :     

 section.     Uterine ablation. Left hand surgery. Crowns. 

 

ENCOUNTER:     

Initial

 

ACUITY:     

2 weeks

 

PAIN SCORE:     

9/10

 

LOCATION:     

Bilateral pelvis 

                     

MEASUREMENTS:     

 

UTERUS:                                  

10.0 x 4.9 x 5.3 cm 

 

ENDOMETRIAL STRIPE:      

>20 mm 

 

RIGHT OVARY:                       

cm Non visualized

 

LEFT OVARY:                         

3.5 x 2.4 x 2.0 cm 

 

FINDINGS:     

Multiple cystic areas noted in the lower uterine segment and cervix. History of uterine ablation. End
ometrium is thickened to greater than 2 cm with echogenic areas present. Trace fluid in the endometri
al canal. Right ovary not visualized. Left ovary not well visualized but normal in size. No free flui
d.

 

CONCLUSION:     

Markedly thickened and heterogeneous endometrium with trace fluid. Reportedly there is recent history
 of uterine ablation. No adnexal mass or free fluid. Small nabothian cysts. 

 

 

 En Mendiola MD on 2017 at 21:10           

Board Certified Radiologist.

 This report was verified electronically.

## 2017-10-22 NOTE — PD
HPI


Chief Complaint:  Gyn Problem/Complaint


Time Seen by Provider:  01:45


Travel History


International Travel<30 days:  No


Contact w/Intl Traveler<30days:  No


Traveled to known affect area:  No





History of Present Illness


HPI


The patient is a 42-year-old female, G2, P2, A0 that states she has a low-grade 

fever at home of 101.  She has nausea without vomiting.  On the 2nd of this 

month Dr. Barraza did an ablation of the uterine endometrium for vaginal 

bleeding.  Apparently this was a rather extensive ablation.  The patient has 

noted in the last week some vaginal bleeding which is been progressive and she 

has had some midline pelvic pain in the last day or 2.  She does not have a 

foul smelling vaginal discharge but she does have some vaginal discharge.





PFSH


Past Medical History


Asthma:  Yes


Autoimmune Disease:  Yes (LUPUS)


Anxiety:  Yes


Depression:  Yes


Heart Rhythm Problems:  No


Cancer:  No


Cardiac Catheterization:  No


Cardiovascular Problems:  Yes (HEART MURMUR)


High Cholesterol:  No


Congestive Heart Failure:  No


Diabetes:  No


Diminished Hearing:  No


Endocrine:  Yes


Gastrointestinal Disorders:  No


GERD:  Yes


Genitourinary:  No


Hepatitis:  No


Hiatal Hernia:  No


Heparin Induced Thrombocytopen:  No


Hypertension:  No


Immune Disorder:  Yes (LUPUS)


Medical other:  Yes (DIVERTICULOSIS)


Musculoskeletal:  Yes (HX OF COSTOCHONDRITIS)


Neurologic:  No


Psychiatric:  Yes (ANXIETY AND PANIC ATTACKS. NO MEDS USED)


Reproductive:  No


Respiratory:  Yes (HX OF ASTHMA)


Immunizations Current:  Yes


Thyroid Disease:  Yes (HYPOTHYROID)


Influenza Vaccination:  No


Pregnant?:  Not Pregnant


:  2


Para:  2





Past Surgical History


Abdominal Surgery:  Yes


Body Medical Devices:  N/A


Cardiac Surgery:  No


 Section:  Yes (X2)


Coronary Artery Bypass Graft:  No


Ear Surgery:  No


Endocrine Surgery:  No


Eye Surgery:  No


Genitourinary Surgery:  No


Gynecologic Surgery:  Yes (C SECTIONX2, UTERINE ABLATION)


Joint Replacement:  No


Neurologic Surgery:  No


Oral Surgery:  Yes (CROWNS)


Pacemaker:  No


Thoracic Surgery:  No


Other Surgery:  Yes





Social History


Alcohol Use:  Yes (Socially)


Tobacco Use:  No


Substance Use:  No





Allergies-Medications


(Allergen,Severity, Reaction):  


Coded Allergies:  


     penicillin G (Verified  Allergy, Severe, RASH, 10/22/17)


Reported Meds & Prescriptions





Reported Meds & Active Scripts


Active


Flagyl (Metronidazole) 500 Mg Tab 500 Mg PO TID 10 Days


Zofran (Ondansetron HCl) 8 Mg Tab 8 Mg PO TID


Doxycycline Hyclate 100 Mg Cap 100 Mg PO BID


Reported


Levothyroxine (Levothyroxine Sodium) 88 Mcg Tab 88 Mcg PO DAILY


Sertraline (Sertraline HCl) 50 Mg Tab 75 Mg PO DAILY








Review of Systems


Except as stated in HPI:  all other systems reviewed are Neg





Physical Exam


Narrative


GENERAL: The patient is alert, oriented 3 and slight apparent distress with 

her midline pelvic discomfort.  Her vital signs show temperature 99.8 with 

blood pressure 150/81 and heart rate of 93.  The rest the vital signs are 

normal.


SKIN: Focused skin assessment warm/dry.


HEAD: Atraumatic. Normocephalic. 


EYES: Pupils equal and round. No scleral icterus. No injection or drainage. 


ENT: No nasal bleeding or discharge.  Mucous membranes pink and moist.


NECK: Trachea midline. No JVD. 


CARDIOVASCULAR: Regular rate and rhythm.  No murmur appreciated.


RESPIRATORY: No accessory muscle use. Clear to auscultation. Breath sounds 

equal bilaterally. 


GASTROINTESTINAL: Abdomen soft, non-tender, nondistended. Hepatic and splenic 

margins not palpable.  No guarding or rebound.  The patient does not have any 

pelvic tenderness.


MUSCULOSKELETAL: No obvious deformities. No clubbing.  No cyanosis.  No edema. 


NEUROLOGICAL: Awake and alert. No obvious cranial nerve deficits.  Motor 

grossly within normal limits. Normal speech.


PSYCHIATRIC: Appropriate mood and affect; insight and judgment normal.


GENITOURINARY: Normal external genitalia without lesions or erythema.  Vaginal 

vault with a tiny amount of blood and a slight non-foul-smelling slightly 

bloody drainage. Cervical os was closed with slightly bloody drainage.  There 

is some slight cervical motion tenderness. Uterus slightly tender and 

nonenlarged.  Bilateral adnexa nontender without masses.





Data


Data


Last Documented VS





Vital Signs








  Date Time  Temp Pulse Resp B/P (MAP) Pulse Ox O2 Delivery O2 Flow Rate FiO2


 


10/22/17 03:15        


 


10/22/17 02:58 98.6 88 14  97 Room Air  








Orders





 Orders


Complete Blood Count With Diff (10/22/17 01:56)


Basic Metabolic Panel (Bmp) (10/22/17 01:56)


Gc And Chlamydia Pcr (10/22/17 01:56)


Wet Prep Profile (10/22/17 01:56)


Doxycycline (Vibratab) (10/22/17 02:15)


Ceftriaxone Inj (Rocephin Inj) (10/22/17 02:15)


Metronidazole (Flagyl) (10/22/17 03:15)


Metronidazole (Flagyl) (10/22/17 03:15)


Ondansetron  Odt (Zofran  Odt) (10/22/17 03:15)





Labs





Laboratory Tests








Test


  10/22/17


02:00 10/22/17


02:05


 


Clue Cells (Wet Prep) NONE SEEN  


 


Vaginal Trichomonas (Wet Prep) NONE SEEN  


 


Vaginal Yeast (Wet Prep) NONE SEEN  


 


White Blood Count  19.2 TH/MM3 


 


Red Blood Count  4.54 MIL/MM3 


 


Hemoglobin  13.1 GM/DL 


 


Hematocrit  38.6 % 


 


Mean Corpuscular Volume  84.9 FL 


 


Mean Corpuscular Hemoglobin  29.0 PG 


 


Mean Corpuscular Hemoglobin


Concent 


  34.1 % 


 


 


Red Cell Distribution Width  12.6 % 


 


Platelet Count  307 TH/MM3 


 


Mean Platelet Volume  8.0 FL 


 


Neutrophils (%) (Auto)  83.3 % 


 


Lymphocytes (%) (Auto)  8.7 % 


 


Monocytes (%) (Auto)  5.5 % 


 


Eosinophils (%) (Auto)  1.6 % 


 


Basophils (%) (Auto)  0.9 % 


 


Neutrophils # (Auto)  15.9 TH/MM3 


 


Lymphocytes # (Auto)  1.7 TH/MM3 


 


Monocytes # (Auto)  1.1 TH/MM3 


 


Eosinophils # (Auto)  0.3 TH/MM3 


 


Basophils # (Auto)  0.2 TH/MM3 


 


CBC Comment  AUTO DIFF 


 


Differential Comment


  


  AUTO DIFF


CONFIRMED


 


Blood Urea Nitrogen  12 MG/DL 


 


Creatinine  0.76 MG/DL 


 


Random Glucose  112 MG/DL 


 


Calcium Level  8.1 MG/DL 


 


Sodium Level  136 MEQ/L 


 


Potassium Level  3.6 MEQ/L 


 


Chloride Level  102 MEQ/L 


 


Carbon Dioxide Level  27.4 MEQ/L 


 


Anion Gap  7 MEQ/L 


 


Estimat Glomerular Filtration


Rate 


  84 ML/MIN 


 











MDM


Medical Decision Making


Medical Screen Exam Complete:  Yes


Emergency Medical Condition:  Yes


Medical Record Reviewed:  Yes


Interpretation(s)


The wet prep is negative for Trichomonas, yeast and clue cells.  The basic 

metabolic profile shows a GFR of 84 and calcium 8.1 but is otherwise normal.  

The CBC shows a white count of 19,200 with 83% neutrophils but is otherwise 

normal.


Differential Diagnosis


PID, pelvic abscess-unlikely, cuff infection, expected drainage from uterine 

ablation, endometritis, bacterial vaginosis, yeast infection, Trichomonas 

vaginitis


Narrative Course


The patient does have some expected drainage from a rather extensive uterine 

ablation.  However, the fever suggests some endometritis.  The patient will be 

given a gram of Rocephin IV and doxycycline.  She will need to follow-up with 

Dr. Barraza, call Monday





Diagnosis





 Primary Impression:  


 Endometritis





***Additional Instructions:  


As we discussed, call Dr. Hall on Monday.  Do not engage in sexual 

intercourse and the metronidazole is one tablet 3 times daily and the 

doxycycline is one tablet twice daily.  Do not drink alcohol as this can cause 

a bad reaction with the metronidazole.  Dr. Hall will be waiting for a call 

from you Monday morning to set up that appointment.


***Med/Other Pt SpecificInfo:  Prescription(s) given


Scripts


Metronidazole (Flagyl) 500 Mg Tab


500 MG PO TID for Infection for 10 Days, TAB 0 Refills


   Prov: Ceferino Story MD         10/22/17 


Ondansetron (Zofran) 8 Mg Tab


8 MG PO TID for Nausea/Vomiting, #21 TAB 0 Refills


   Prov: Ceferino Story MD         10/22/17 


Doxycycline Hyclate (Doxycycline Hyclate) 100 Mg Cap


100 MG PO BID for Infection, #20 CAP 0 Refills


   Prov: Ceferino Story MD         10/22/17


Disposition:   DISCHARGE HOME


Condition:  Stable











Ceferino Story MD Oct 22, 2017 01:56

## 2017-10-22 NOTE — PD
HPI


Chief Complaint:  Gyn Problem/Complaint


Time Seen by Provider:  19:54


Travel History


International Travel<30 days:  No


Contact w/Intl Traveler<30days:  No


Traveled to known affect area:  No





History of Present Illness


HPI


42-year-old female presents to the emergency department for evaluation of 

pelvic pain, generalized pain.  Patient is a G2, P2 that had an ablation of the 

uterine endometrium for vaginal bleeding done on  of this month by Dr. Barraza.  According to previous notes, it was an extensive ablation.  She 

followed up with Dr. Barraza on Monday and was doing well.  However, since then 

she started with generalized body aches, pelvic pain, fevers.  Patient was seen 

in [place] emergency department last night and was discharged home with 

prescription for doxycycline, Flagyl, Zofran for endometritis.  She had lab 

work and pelvic exam completed at that time.  Labs did show leukocytosis of 

19.2.  Wet prep was negative.  GC and chlamydia are still pending.  Patient was 

instructed to return if she has any worsening symptoms.  She denies any fever 

since being seen last night.  However, she started with worsening pelvic pain.  

Patient also reports history of lupus.  She is not on any other medications 

other than the antibiotics prescribed to her last night.





PFSH


Past Medical History


Asthma:  Yes


Autoimmune Disease:  Yes (LUPUS)


Anxiety:  Yes


Depression:  Yes


Heart Rhythm Problems:  No


Cancer:  No


Cardiac Catheterization:  No


Cardiovascular Problems:  Yes (HEART MURMUR)


High Cholesterol:  No


Congestive Heart Failure:  No


Diabetes:  No


Diminished Hearing:  No


Endocrine:  Yes


Gastrointestinal Disorders:  No


GERD:  Yes


Genitourinary:  No


Hepatitis:  No


Hiatal Hernia:  No


Heparin Induced Thrombocytopen:  No


Hypertension:  No


Immune Disorder:  Yes (LUPUS)


Medical other:  Yes (DIVERTICULOSIS)


Musculoskeletal:  Yes (HX OF COSTOCHONDRITIS)


Neurologic:  No


Psychiatric:  Yes (ANXIETY AND PANIC ATTACKS. NO MEDS USED)


Reproductive:  No


Respiratory:  Yes


Immunizations Current:  Yes


Thyroid Disease:  Yes (HYPOTHYROID)


Pregnant?:  Not Pregnant


:  2


Para:  2





Past Surgical History


Abdominal Surgery:  Yes


Body Medical Devices:  N/A


Cardiac Surgery:  No


 Section:  Yes (X2)


Coronary Artery Bypass Graft:  No


Ear Surgery:  No


Endocrine Surgery:  No


Eye Surgery:  No


Genitourinary Surgery:  No


Gynecologic Surgery:  Yes (UTERINE ABLATION)


Joint Replacement:  No


Neurologic Surgery:  No


Oral Surgery:  Yes (CROWNS)


Pacemaker:  No


Thoracic Surgery:  No


Other Surgery:  Yes





Social History


Alcohol Use:  Yes (Socially)


Tobacco Use:  No


Substance Use:  No





Allergies-Medications


(Allergen,Severity, Reaction):  


Coded Allergies:  


     penicillin G (Verified  Allergy, Severe, RASH, 10/22/17)


Reported Meds & Prescriptions





Reported Meds & Active Scripts


Active


Flagyl (Metronidazole) 500 Mg Tab 500 Mg PO TID 10 Days


Zofran (Ondansetron HCl) 8 Mg Tab 8 Mg PO TID


Doxycycline Hyclate 100 Mg Cap 100 Mg PO BID


Reported


Levothyroxine (Levothyroxine Sodium) 88 Mcg Tab 88 Mcg PO DAILY


Sertraline (Sertraline HCl) 50 Mg Tab 75 Mg PO DAILY








Review of Systems


Except as stated in HPI:  all other systems reviewed are Neg





Physical Exam


Narrative


GENERAL: Well-nourished, well-developed female patient, ambulatory.  Afebrile.


SKIN: Focused skin assessment warm/dry.


HEAD: Normocephalic.  Atraumatic


EYES: No scleral icterus. No injection or drainage. 


NECK: Supple, trachea midline. No JVD or lymphadenopathy.


CARDIOVASCULAR: Regular rate and rhythm without murmurs, gallops, or rubs. 


RESPIRATORY: Breath sounds equal bilaterally. No accessory muscle use.  Lungs 

sounds are clear to auscultation.


GASTROINTESTINAL: Abdomen soft and nondistended.  Patient has pelvic tenderness 

to palpation.


MUSCULOSKELETAL: No cyanosis, or edema. 


BACK: Nontender without obvious deformity. No CVA tenderness.





Data


Data


Last Documented VS





Vital Signs








  Date Time  Temp Pulse Resp B/P (MAP) Pulse Ox O2 Delivery O2 Flow Rate FiO2


 


10/22/17 19:19 98.1 75 16 141/85 (103) 98 Room Air  








Orders





 Orders


Complete Blood Count With Diff (10/22/17 19:56)


Comprehensive Metabolic Panel (10/22/17 19:56)


Urinalysis - C+S If Indicated (10/22/17 19:56)


Us Pelvis Comp Gyn/Non-Preg (10/22/17 )


Iv Access Insert/Monitor (10/22/17 19:56)


Sodium Chlor 0.9% 1000 Ml Inj (Ns 1000 M (10/22/17 19:56)


Blood Culture (10/22/17 19:56)


Lactic Acid Sepsis Protocol (10/22/17 19:56)





Labs





Laboratory Tests








Test


  10/22/17


20:25











[facility]


Medical Decision Making


Medical Screen Exam Complete:  Yes


Emergency Medical Condition:  Yes


Medical Record Reviewed:  Yes


Differential Diagnosis


Endometritis ice versus sepsis versus UTI versus PID versus unlikely pelvic 

abscess


Narrative Course


42-year-old female presents to the emergency department for worsening pelvic 

pain since being seen last night.  CBC, CMP, UA, lactic acid, blood cultures 2 

are ordered and pending.  Pelvic ultrasound is ordered and pending.





My attending physician, Dr. Yun, will resume care and disposition of patient.











Amanda Buchanan Oct 22, 2017 20:16

## 2017-10-22 NOTE — PD
HPI


Chief Complaint:  Gyn Problem/Complaint


Time Seen by Provider:  19:54


Travel History


International Travel<30 days:  No


Contact w/Intl Traveler<30days:  No


Traveled to known affect area:  No





History of Present Illness


HPI


42-year-old female presents to the emergency department for evaluation of 

pelvic pain, generalized pain.  Patient is a G2, P2 that had an ablation of the 

uterine endometrium for vaginal bleeding done on  of this month by Dr. Barraza.  According to previous notes, it was an extensive ablation.  She 

followed up with Dr. Barraza on Monday and was doing well.  However, since then 

she started with generalized body aches, pelvic pain, fevers.  Patient was seen 

in [facility] emergency department last night and was discharged home with 

prescription for doxycycline, Flagyl, Zofran for endometritis.  She had lab 

work and pelvic exam completed at that time.  Labs did show leukocytosis of 

19.2.  Wet prep was negative.  GC and chlamydia are still pending.  Patient was 

instructed to return if she has any worsening symptoms.  She denies any fever 

since being seen last night.  However, she started with worsening pelvic pain.  

Patient also reports history of lupus.  She is not on any other medications 

other than the antibiotics prescribed to her last night.





PFSH


Past Medical History


Asthma:  Yes


Autoimmune Disease:  Yes (LUPUS)


Anxiety:  Yes


Depression:  Yes


Heart Rhythm Problems:  No


Cancer:  No


Cardiac Catheterization:  No


Cardiovascular Problems:  Yes (HEART MURMUR)


High Cholesterol:  No


Congestive Heart Failure:  No


Diabetes:  No


Diminished Hearing:  No


Endocrine:  Yes


Gastrointestinal Disorders:  No


GERD:  Yes


Genitourinary:  No


Hepatitis:  No


Hiatal Hernia:  No


Heparin Induced Thrombocytopen:  No


Hypertension:  No


Immune Disorder:  Yes (LUPUS)


Medical other:  Yes (DIVERTICULOSIS)


Musculoskeletal:  Yes (HX OF COSTOCHONDRITIS)


Neurologic:  No


Psychiatric:  Yes (ANXIETY AND PANIC ATTACKS. NO MEDS USED)


Reproductive:  No


Respiratory:  Yes


Immunizations Current:  Yes


Thyroid Disease:  Yes (HYPOTHYROID)


Pregnant?:  Not Pregnant


:  2


Para:  2





Past Surgical History


Abdominal Surgery:  Yes


Body Medical Devices:  N/A


Cardiac Surgery:  No


 Section:  Yes (X2)


Coronary Artery Bypass Graft:  No


Ear Surgery:  No


Endocrine Surgery:  No


Eye Surgery:  No


Genitourinary Surgery:  No


Gynecologic Surgery:  Yes (UTERINE ABLATION)


Joint Replacement:  No


Neurologic Surgery:  No


Oral Surgery:  Yes (CROWNS)


Pacemaker:  No


Thoracic Surgery:  No


Other Surgery:  Yes





Social History


Alcohol Use:  Yes (Socially)


Tobacco Use:  No


Substance Use:  No





Allergies-Medications


(Allergen,Severity, Reaction):  


Coded Allergies:  


     penicillin G (Verified  Allergy, Severe, RASH, 10/22/17)


Reported Meds & Prescriptions





Reported Meds & Active Scripts


Active


Flagyl (Metronidazole) 500 Mg Tab 500 Mg PO TID 10 Days


Zofran (Ondansetron HCl) 8 Mg Tab 8 Mg PO TID


Doxycycline Hyclate 100 Mg Cap 100 Mg PO BID


Reported


Levothyroxine (Levothyroxine Sodium) 88 Mcg Tab 88 Mcg PO DAILY


Sertraline (Sertraline HCl) 50 Mg Tab 75 Mg PO DAILY








Review of Systems


Except as stated in HPI:  all other systems reviewed are Neg





Physical Exam


Narrative


GENERAL: Well-nourished, well-developed female patient, ambulatory.  Afebrile.


SKIN: Focused skin assessment warm/dry.


HEAD: Normocephalic.  Atraumatic


EYES: No scleral icterus. No injection or drainage. 


NECK: Supple, trachea midline. No JVD or lymphadenopathy.


CARDIOVASCULAR: Regular rate and rhythm without murmurs, gallops, or rubs. 


RESPIRATORY: Breath sounds equal bilaterally. No accessory muscle use.  Lungs 

sounds are clear to auscultation.


GASTROINTESTINAL: Abdomen soft and nondistended.  Patient has pelvic tenderness 

to palpation.


MUSCULOSKELETAL: No cyanosis, or edema. 


BACK: Nontender without obvious deformity. No CVA tenderness.





Data


Data


Last Documented VS





Vital Signs








  Date Time  Temp Pulse Resp B/P (MAP) Pulse Ox O2 Delivery O2 Flow Rate FiO2


 


10/22/17 19:19 98.1 75 16 141/85 (103) 98 Room Air  








Orders





 Orders


Complete Blood Count With Diff (10/22/17 19:56)


Comprehensive Metabolic Panel (10/22/17 19:56)


Urinalysis - C+S If Indicated (10/22/17 19:56)


Us Pelvis Comp Gyn/Non-Preg (10/22/17 )


Iv Access Insert/Monitor (10/22/17 19:56)


Sodium Chlor 0.9% 1000 Ml Inj (Ns 1000 M (10/22/17 19:56)


Blood Culture (10/22/17 19:56)


Lactic Acid Sepsis Protocol (10/22/17 19:56)





Labs





Laboratory Tests








Test


  10/22/17


20:25











[facility]


Medical Decision Making


Medical Screen Exam Complete:  Yes


Emergency Medical Condition:  Yes


Medical Record Reviewed:  Yes


Differential Diagnosis


Endometritis ice versus sepsis versus UTI versus PID versus unlikely pelvic 

abscess


Narrative Course


42-year-old female presents to the emergency department for worsening pelvic 

pain since being seen last night.  CBC, CMP, UA, lactic acid, blood cultures 2 

are ordered and pending.  Pelvic ultrasound is ordered and pending.





My attending physician, Dr. Yun, will resume care and disposition of patient.











Amanda Buchanan Oct 22, 2017 20:16

## 2017-10-22 NOTE — PD
Physical Exam


Narrative


Patient was seen by assistant  and signed out to me





Data


Data


Last Documented VS





Vital Signs








  Date Time  Temp Pulse Resp B/P (MAP) Pulse Ox O2 Delivery O2 Flow Rate FiO2


 


10/22/17 19:19 98.1 75 16 141/85 (103) 98 Room Air  








Orders





 Orders


Complete Blood Count With Diff (10/22/17 19:56)


Comprehensive Metabolic Panel (10/22/17 19:56)


Urinalysis - C+S If Indicated (10/22/17 19:56)


Iv Access Insert/Monitor (10/22/17 19:56)


Sodium Chlor 0.9% 1000 Ml Inj (Ns 1000 M (10/22/17 19:56)


Blood Culture (10/22/17 19:56)


Lactic Acid Sepsis Protocol (10/22/17 19:56)


Us Pelvis Comp W Transvaginal (10/22/17 )


Ceftriaxone Inj (Rocephin Inj) (10/22/17 22:00)





Labs





Laboratory Tests








Test


  10/22/17


20:25 10/22/17


21:00


 


Urine Color YELLOW  


 


Urine Turbidity HAZY  


 


Urine pH 6.5  


 


Urine Specific Gravity 1.014  


 


Urine Protein NEG mg/dL  


 


Urine Glucose (UA) NEG mg/dL  


 


Urine Ketones NEG mg/dL  


 


Urine Occult Blood LARGE  


 


Urine Nitrite NEG  


 


Urine Bilirubin NEG  


 


Urine Urobilinogen


  LESS THAN 2.0


MG/DL 


 


 


Urine Leukocyte Esterase LARGE  


 


Urine RBC 49 /hpf  


 


Urine WBC 7 /hpf  


 


Urine Squamous Epithelial


Cells 1 /hpf 


  


 


 


Urine Amorphous Sediment RARE  


 


Urine Bacteria RARE /hpf  


 


Microscopic Urinalysis Comment


  CULT NOT


INDICATED 


 


 


White Blood Count  16.2 TH/MM3 


 


Red Blood Count  4.56 MIL/MM3 


 


Hemoglobin  13.5 GM/DL 


 


Hematocrit  40.1 % 


 


Mean Corpuscular Volume  87.9 FL 


 


Mean Corpuscular Hemoglobin  29.6 PG 


 


Mean Corpuscular Hemoglobin


Concent 


  33.6 % 


 


 


Red Cell Distribution Width  13.6 % 


 


Platelet Count  273 TH/MM3 


 


Mean Platelet Volume  8.0 FL 


 


Neutrophils (%) (Auto)  78.9 % 


 


Lymphocytes (%) (Auto)  11.1 % 


 


Monocytes (%) (Auto)  7.1 % 


 


Eosinophils (%) (Auto)  2.5 % 


 


Basophils (%) (Auto)  0.4 % 


 


Neutrophils # (Auto)  12.8 TH/MM3 


 


Lymphocytes # (Auto)  1.8 TH/MM3 


 


Monocytes # (Auto)  1.2 TH/MM3 


 


Eosinophils # (Auto)  0.4 TH/MM3 


 


Basophils # (Auto)  0.1 TH/MM3 


 


CBC Comment  DIFF FINAL 


 


Differential Comment   


 


Blood Urea Nitrogen  6 MG/DL 


 


Creatinine  0.69 MG/DL 


 


Random Glucose  85 MG/DL 


 


Total Protein  7.5 GM/DL 


 


Albumin  3.5 GM/DL 


 


Calcium Level  8.9 MG/DL 


 


Alkaline Phosphatase  84 U/L 


 


Aspartate Amino Transf


(AST/SGOT) 


  26 U/L 


 


 


Alanine Aminotransferase


(ALT/SGPT) 


  26 U/L 


 


 


Total Bilirubin  0.7 MG/DL 


 


Sodium Level  136 MEQ/L 


 


Potassium Level  3.9 MEQ/L 


 


Chloride Level  102 MEQ/L 


 


Carbon Dioxide Level  28.1 MEQ/L 


 


Anion Gap  6 MEQ/L 


 


Estimat Glomerular Filtration


Rate 


  94 ML/MIN 


 


 


Lactic Acid Level  0.8 mmol/L 











MDM


Supervised Visit with BLANCA:  Yes


Interpretation(s)





Last Impressions








Pelvis Ultrasound 10/22/17 0000 Signed





Impressions: 





 Service Date/Time:  Sunday, October 22, 2017 20:13 - CONCLUSION:  Markedly 





 thickened and heterogeneous endometrium with trace fluid. Reportedly there is 





 recent history of uterine ablation. No adnexal mass or free fluid. Small 





 nabothian cysts.     En Mendiola MD 





21:41 PM.  CBC WBC 16.2.  78 neutrophil.  UA positive for RBC, 7 WBC and 

bacteria.


Narrative Course


Rocephin 1 g IV given.


Diagnosis





 Primary Impression:  


 Endometritis


Patient Instructions:  General Instructions





***Additional Instruction:  


Continue with all medications.  Hydrocodone as needed for pain.  Follow-up with 

personal physician.  Return if worse.


***Med/Other Pt SpecificInfo:  No Change to Meds


Scripts


Hydrocodone-Acetaminophen (Norco) 5-325 mg Tab


1 TAB PO Q6H Y for PAIN, #20 TAB 0 Refills


   Prov: Evangelista Yun MD         10/22/17


Disposition:  01 DISCHARGE HOME


Condition:  Stable











Evangelista Yun MD Oct 22, 2017 21:45

## 2017-11-29 ENCOUNTER — HOSPITAL ENCOUNTER (OUTPATIENT)
Dept: HOSPITAL 17 - ESDC | Age: 42
Discharge: HOME | End: 2017-11-29
Attending: SURGERY
Payer: COMMERCIAL

## 2017-11-29 DIAGNOSIS — N60.02: Primary | ICD-10-CM

## 2017-11-29 DIAGNOSIS — K40.20: ICD-10-CM

## 2017-11-29 PROCEDURE — 49505 PRP I/HERN INIT REDUC >5 YR: CPT

## 2017-11-29 PROCEDURE — 00400 ANES INTEGUMENTARY SYS NOS: CPT

## 2017-11-29 PROCEDURE — C1781 MESH (IMPLANTABLE): HCPCS

## 2017-11-29 PROCEDURE — 19125 EXCISION BREAST LESION: CPT

## 2017-11-29 PROCEDURE — 88307 TISSUE EXAM BY PATHOLOGIST: CPT

## 2017-11-29 PROCEDURE — 00830 ANES HERNIA RPR LWR ABD NOS: CPT

## 2017-11-29 NOTE — TN
cc:

EN NEGRETE M.D.

****

 

 

DATE OF SURGERY

November 29, 2017

 

 

PREOPERATIVE DIAGNOSES

1. Abnormality seen on breast imaging, left breast.

2. Symptomatic bilateral inguinal hernias, left greater than right

 

POSTOPERATIVE DIAGNOSES

1. Abnormality seen on breast imaging, left breast.

2. Symptomatic bilateral inguinal hernias, left greater than right, the right 
side was bigger than the left

 

PROCEDURE

1. Needle-localized excisional lumpectomy left breast, lower outer quadrant.

2. Repair of left inguinal hernia with mesh with removal of cord lipoma.

3. Repair of right inguinal hernia with mesh with removal of cord lipoma.

 

ANESTHESIA

General.

 

SURGEON

Dr. Negrete

 

ASSISTANT

Ms. Jeanie Duke, Advance Registered Nurse Practitioner.



The ARNP was present from beginning to the end of the case assisting in all 
portions of the procedure.

It was necessary to have this individual in the room to assist in the above 
surgical procedure.

The surgical procedure was assisted by the ARNP. The ARNP presence was 
necessary throughout the case for appropriate retraction, dissection, 
visualization, and resection of the important anatomical structures during the 
surgical procedure. The ARNP was assisting throughout the entirety of the 
operation. The skill set of the ARNP is medically and surgically necessary to 
safely complete the surgical procedure. During the surgical case, the operating 
room surgical tech was working instrument table and passing instruments to the 
attending surgeon and ARNP.  The ARNP was directly assisting the operating 
surgeon and involved in the technical aspects of the surgical case.



 

INDICATIONS

This is a pleasant female who has had bilateral inguinal hernias.  She also has

a density in her left breast that has been followed.  She is very concerned

with it.  Although it has been fairly benign, she is overly concerned about it

And wants it to be removed so she does not have to do routine frequent follow-
up imaging and

mammograms to follow the density.  She would like it removed to assure that it

is not a malignancy.  Plans were made for above.

 

 

 

 

PROCEDURE

The patient was taken to the operating room, placed supine on the operating

room table in supine position.  After general anesthesia, time-out was done.

She was given preoperative antibiotics.

 

Left breast needle localized biopsy 

We direct our attention to the left breast first.  After prepping and draping,

a curvilinear incision was made in the nipple-areolar complex.  The guidewire

had been previously placed in Radiology coursing from an inferior to superior

direction, from lateral to medial.  We are able to dissect down to the

guidewire just underneath the skin where it enters in the lower outer quadrant

of the left breast.  We clip the guidewire at the skin and we are able to

circumferentially dissect down to the guidewire and the surrounding tissue.

This was sent down to Imaging where the radiologist states the area in question

has been removed.  We then irrigate hemostasis assured.  We then close the deep

layer with 3-0 Vicryl and skin is closed with a 4-0 Vicryl.

 

Left hernia repair 



We then prep and drape the groins with Betadine.  We first direct our attention

to the left side.  We make a somewhat oblique incision incorporating her low

Pfannenstiel incision, dissect down through the subcutaneous tissue, Loretta's

fascia, excising the external oblique aponeurosis.  We visualize an indirect

defect.  The round ligament is almost nonexistent; it is sacrificed as well as

the ilioinguinal nerve.  The vessels are tied off with a Vicryl suture.  We

then take a piece of polypropylene mesh securing it to the pubic tubercle,

Clarence's ligament, the iliopubic tract out laterally and the conjoined tendon

medially, all done with 0 Ethibond.  The external oblique aponeurosis was then

closed with a 2-0 Vicryl, Loretta's with a 2-0 Vicryl, the skin with 4-0

Vicryl.

 

Right hernia repair 



We then direct our attention to the right side.  A similar incision was made

incorporating the previous low Pfannenstiel incision, extending it out

laterally, dissect down to subcutaneous tissue, identifying the external

oblique aponeurosis and incising this to open the canal.  The external oblique

aponeurosis was somewhat adherent to the internal oblique.  This is 

using electrocautery device.  A moderate-sized hernia is identified.  It is

able to be reduced.  The cord lipoma is removed as well.  The round ligament is

sacrificed.  The ilioinguinal nerve is sacrificed as well.  We dissect all the

way up into the internal oblique aponeurosis.  After this was done we then

place a piece of polypropylene mesh securing to the pubic tubercle, Clarence's

ligament, the iliopubic tract out laterally and the conjoined tendon medially

so the mesh lays flat.  Then close the external oblique aponeurosis with a 2-0

Vicryl, the deep layer with a 2-0 Vicryl and the skin 4-0 Vicryl. Steri-Strips

applied.  Sterile bandage applied.

 

The patient tolerated the procedure well and had no immediate postop

complications.

 

 

                              _________________________________

                              En Negrete MD

 

 

 

JTANA/MIKHAIL

D:  11/29/2017/1:18 PM

T:  11/29/2017/1:28 PM

Visit #:  [id number]

Job #:  [id number]

[id number]

## 2018-03-28 ENCOUNTER — HOSPITAL ENCOUNTER (EMERGENCY)
Dept: HOSPITAL 17 - NEPD | Age: 43
Discharge: HOME | End: 2018-03-28
Payer: COMMERCIAL

## 2018-03-28 VITALS
TEMPERATURE: 97.9 F | RESPIRATION RATE: 15 BRPM | DIASTOLIC BLOOD PRESSURE: 80 MMHG | SYSTOLIC BLOOD PRESSURE: 129 MMHG | HEART RATE: 64 BPM | OXYGEN SATURATION: 100 %

## 2018-03-28 VITALS — BODY MASS INDEX: 40.57 KG/M2 | HEIGHT: 62 IN | WEIGHT: 220.46 LBS

## 2018-03-28 DIAGNOSIS — E86.0: Primary | ICD-10-CM

## 2018-03-28 DIAGNOSIS — E03.9: ICD-10-CM

## 2018-03-28 DIAGNOSIS — R19.7: ICD-10-CM

## 2018-03-28 LAB
BASOPHILS # BLD AUTO: 0.1 TH/MM3 (ref 0–0.2)
BASOPHILS NFR BLD: 0.9 % (ref 0–2)
BUN SERPL-MCNC: 6 MG/DL (ref 7–18)
CALCIUM SERPL-MCNC: 8.5 MG/DL (ref 8.5–10.1)
CHLORIDE SERPL-SCNC: 107 MEQ/L (ref 98–107)
COLOR UR: (no result)
CREAT SERPL-MCNC: 0.78 MG/DL (ref 0.5–1)
EOSINOPHIL # BLD: 0.4 TH/MM3 (ref 0–0.4)
EOSINOPHIL NFR BLD: 4.2 % (ref 0–4)
ERYTHROCYTE [DISTWIDTH] IN BLOOD BY AUTOMATED COUNT: 13.7 % (ref 11.6–17.2)
GFR SERPLBLD BASED ON 1.73 SQ M-ARVRAT: 81 ML/MIN (ref 89–?)
GLUCOSE SERPL-MCNC: 88 MG/DL (ref 74–106)
GLUCOSE UR STRIP-MCNC: (no result) MG/DL
HCO3 BLD-SCNC: 27.4 MEQ/L (ref 21–32)
HCT VFR BLD CALC: 41.5 % (ref 35–46)
HGB BLD-MCNC: 14.1 GM/DL (ref 11.6–15.3)
HGB UR QL STRIP: (no result)
KETONES UR STRIP-MCNC: (no result) MG/DL
LYMPHOCYTES # BLD AUTO: 2.3 TH/MM3 (ref 1–4.8)
LYMPHOCYTES NFR BLD AUTO: 26.3 % (ref 9–44)
MCH RBC QN AUTO: 29.9 PG (ref 27–34)
MCHC RBC AUTO-ENTMCNC: 33.9 % (ref 32–36)
MCV RBC AUTO: 88.2 FL (ref 80–100)
MONOCYTE #: 0.6 TH/MM3 (ref 0–0.9)
MONOCYTES NFR BLD: 6.9 % (ref 0–8)
NEUTROPHILS # BLD AUTO: 5.5 TH/MM3 (ref 1.8–7.7)
NEUTROPHILS NFR BLD AUTO: 61.7 % (ref 16–70)
NITRITE UR QL STRIP: (no result)
PLATELET # BLD: 298 TH/MM3 (ref 150–450)
PMV BLD AUTO: 8.5 FL (ref 7–11)
RBC # BLD AUTO: 4.71 MIL/MM3 (ref 4–5.3)
SODIUM SERPL-SCNC: 140 MEQ/L (ref 136–145)
SP GR UR STRIP: 1 (ref 1–1.03)
SQUAMOUS #/AREA URNS HPF: 9 /HPF (ref 0–5)
URINE LEUKOCYTE ESTERASE: (no result)
WBC # BLD AUTO: 8.9 TH/MM3 (ref 4–11)

## 2018-03-28 PROCEDURE — 81001 URINALYSIS AUTO W/SCOPE: CPT

## 2018-03-28 PROCEDURE — 80048 BASIC METABOLIC PNL TOTAL CA: CPT

## 2018-03-28 PROCEDURE — 85025 COMPLETE CBC W/AUTO DIFF WBC: CPT

## 2018-03-28 PROCEDURE — 99283 EMERGENCY DEPT VISIT LOW MDM: CPT

## 2018-03-28 NOTE — PD
HPI


Chief Complaint:  General Weakness


Time Seen by Provider:  14:27


Travel History


International Travel<30 days:  No


Contact w/Intl Traveler<30days:  No


Traveled to known affect area:  No





History of Present Illness


HPI


Is a 42-year-old woman presents emerged from complaining that she has been 

feeling tired rundown lethargic.  She has had loose stools overnight and felt 

dizzy and lightheaded this morning.  She feels better after she ate this 

morning.  She has had nausea but no vomiting.  States she just started working 

out more recently.  No real change in her diet.  No stimulants or supplement 

uses.





History


Past Medical History


*** Narrative Medical


Lupus, no meds


Hypothyroidism


LMP:  2017 - ABLATION


:  2


Para:  2





Social History


Alcohol Use:  Yes (Socially)


Tobacco Use:  No





Allergies-Medications


(Allergen,Severity, Reaction):  


Coded Allergies:  


     penicillin G (Verified  Allergy, Severe, RASH, 3/28/18)


Reported Meds & Prescriptions





Reported Meds & Active Scripts


Active


Norco (Hydrocodone-Acetaminophen) 5-325 mg Tab 1 Tab PO Q6H PRN


Flagyl (Metronidazole) 500 Mg Tab 500 Mg PO TID 10 Days


Zofran (Ondansetron HCl) 8 Mg Tab 8 Mg PO TID


Doxycycline Hyclate 100 Mg Cap 100 Mg PO BID


Reported


Levothyroxine (Levothyroxine Sodium) 88 Mcg Tab 100 Mcg PO DAILY


Sertraline (Sertraline HCl) 50 Mg Tab 75 Mg PO DAILY








Review of Systems


Except as stated in HPI:  all other systems reviewed are Neg





Physical Exam


Narrative


GENERAL: Well-appearing 42-year-old woman, no acute distress.


SKIN: Focused skin assessment warm/dry.


HEAD: Atraumatic. Normocephalic. 


EYES: Pupils equal and round. No scleral icterus. No injection or drainage. 


ENT: No nasal bleeding or discharge.  Mucous membranes pink and moist.


NECK: Trachea midline. No JVD. 


CARDIOVASCULAR: Regular rate and rhythm.  No murmur appreciated.


RESPIRATORY: No accessory muscle use. Clear to auscultation. Breath sounds 

equal bilaterally. 


GASTROINTESTINAL: Abdomen soft, non-tender, nondistended. Hepatic and splenic 

margins not palpable. 


MUSCULOSKELETAL: No obvious deformities. No clubbing.  No cyanosis.  No edema. 


NEUROLOGICAL: Awake and alert. No obvious cranial nerve deficits.  Motor 

grossly within normal limits. Normal speech.


PSYCHIATRIC: Appropriate mood and affect; insight and judgment normal.





Data


Data


Last Documented VS





Vital Signs








  Date Time  Temp Pulse Resp B/P (MAP) Pulse Ox O2 Delivery O2 Flow Rate FiO2


 


3/28/18 12:20 97.9 64 15 129/80 (96) 100   








Orders





 Orders


Urinalysis - C+S If Indicated (3/28/18 12:24)


Complete Blood Count With Diff (3/28/18 12:24)


Basic Metabolic Panel (Bmp) (3/28/18 12:24)


Ed Urine Pregnancytest Poc (3/28/18 12:24)


Ondansetron  Odt (Zofran  Odt) (3/28/18 14:45)


Loperamide (Imodium) (3/28/18 14:45)


Oral Rehydration (3/28/18 14:35)





Labs





Laboratory Tests








Test


  3/28/18


12:47 3/28/18


13:51


 


Urine Color LIGHT-YELLOW  


 


Urine Turbidity CLEAR  


 


Urine pH 7.5  


 


Urine Specific Gravity 1.003  


 


Urine Protein NEG mg/dL  


 


Urine Glucose (UA) NEG mg/dL  


 


Urine Ketones NEG mg/dL  


 


Urine Occult Blood TRACE  


 


Urine Nitrite NEG  


 


Urine Bilirubin NEG  


 


Urine Urobilinogen


  LESS THAN 2.0


MG/DL 


 


 


Urine Leukocyte Esterase NEG  


 


Urine RBC 1 /hpf  


 


Urine WBC


  LESS THAN 1


/hpf 


 


 


Urine Squamous Epithelial


Cells 9 /hpf 


  


 


 


Microscopic Urinalysis Comment


  CULT NOT


INDICATED 


 











MDM


Medical Decision Making


Medical Screen Exam Complete:  Yes


Emergency Medical Condition:  Yes


Interpretation(s)


UA negative


Differential Diagnosis


Dehydration, anemia, renal disease, other


Narrative Course


Well 42-year-old woman who felt tired rundown lightheadedness setting of 

increased workout routine and diarrhea.  Looks well now.  Feeling better after 

eating.  Labs were sent from but are still pending.  She is able to tolerate 

oral rehydration.  We will follow-up on results of her labs, but go ahead and 

allow her to be discharged.  Plan Zofran loperamide if needed.





Diagnosis





 Primary Impression:  


 Dehydration


 Additional Impression:  


 Diarrhea





***Additional Instructions:  


Drink plenty fluids stay well-hydrated.





Follow with her primary doctor in 2-4 days if not feeling improved.





Return to the emergency department for any new or worsening symptoms.


***Med/Other Pt SpecificInfo:  No Change to Meds


Disposition:  01 DISCHARGE HOME


Condition:  Stable











Dread Mann MD Mar 28, 2018 14:54

## 2018-04-13 ENCOUNTER — HOSPITAL ENCOUNTER (EMERGENCY)
Dept: HOSPITAL 17 - NEPC | Age: 43
Discharge: HOME | End: 2018-04-13
Payer: COMMERCIAL

## 2018-04-13 VITALS — RESPIRATION RATE: 18 BRPM | OXYGEN SATURATION: 99 % | HEART RATE: 59 BPM

## 2018-04-13 VITALS
OXYGEN SATURATION: 99 % | RESPIRATION RATE: 16 BRPM | HEART RATE: 67 BPM | DIASTOLIC BLOOD PRESSURE: 84 MMHG | SYSTOLIC BLOOD PRESSURE: 150 MMHG

## 2018-04-13 VITALS
RESPIRATION RATE: 18 BRPM | SYSTOLIC BLOOD PRESSURE: 157 MMHG | TEMPERATURE: 98 F | DIASTOLIC BLOOD PRESSURE: 78 MMHG | HEART RATE: 67 BPM | OXYGEN SATURATION: 99 %

## 2018-04-13 VITALS — BODY MASS INDEX: 36.72 KG/M2 | WEIGHT: 199.52 LBS | HEIGHT: 62 IN

## 2018-04-13 DIAGNOSIS — E03.9: ICD-10-CM

## 2018-04-13 DIAGNOSIS — R51: ICD-10-CM

## 2018-04-13 DIAGNOSIS — R53.83: Primary | ICD-10-CM

## 2018-04-13 DIAGNOSIS — J45.909: ICD-10-CM

## 2018-04-13 DIAGNOSIS — R06.00: ICD-10-CM

## 2018-04-13 DIAGNOSIS — R42: ICD-10-CM

## 2018-04-13 DIAGNOSIS — R07.9: ICD-10-CM

## 2018-04-13 DIAGNOSIS — R94.31: ICD-10-CM

## 2018-04-13 DIAGNOSIS — F41.0: ICD-10-CM

## 2018-04-13 DIAGNOSIS — M32.9: ICD-10-CM

## 2018-04-13 DIAGNOSIS — K21.9: ICD-10-CM

## 2018-04-13 DIAGNOSIS — F32.9: ICD-10-CM

## 2018-04-13 LAB
ALBUMIN SERPL-MCNC: 3.9 GM/DL (ref 3.4–5)
ALP SERPL-CCNC: 107 U/L (ref 45–117)
ALT SERPL-CCNC: 32 U/L (ref 10–53)
AST SERPL-CCNC: 45 U/L (ref 15–37)
BACTERIA #/AREA URNS HPF: (no result) /HPF
BASOPHILS # BLD AUTO: 0.1 TH/MM3 (ref 0–0.2)
BASOPHILS NFR BLD: 1.1 % (ref 0–2)
BILIRUB SERPL-MCNC: 0.3 MG/DL (ref 0.2–1)
BUN SERPL-MCNC: 13 MG/DL (ref 7–18)
CALCIUM SERPL-MCNC: 8.7 MG/DL (ref 8.5–10.1)
CHLORIDE SERPL-SCNC: 105 MEQ/L (ref 98–107)
COLOR UR: YELLOW
CREAT SERPL-MCNC: 0.86 MG/DL (ref 0.5–1)
EOSINOPHIL # BLD: 0.5 TH/MM3 (ref 0–0.4)
EOSINOPHIL NFR BLD: 4.7 % (ref 0–4)
ERYTHROCYTE [DISTWIDTH] IN BLOOD BY AUTOMATED COUNT: 13.3 % (ref 11.6–17.2)
GFR SERPLBLD BASED ON 1.73 SQ M-ARVRAT: 73 ML/MIN (ref 89–?)
GLUCOSE SERPL-MCNC: 76 MG/DL (ref 74–106)
GLUCOSE UR STRIP-MCNC: (no result) MG/DL
HCO3 BLD-SCNC: 28.8 MEQ/L (ref 21–32)
HCT VFR BLD CALC: 41.7 % (ref 35–46)
HGB BLD-MCNC: 14.1 GM/DL (ref 11.6–15.3)
HGB UR QL STRIP: (no result)
KETONES UR STRIP-MCNC: (no result) MG/DL
LYMPHOCYTES # BLD AUTO: 2.5 TH/MM3 (ref 1–4.8)
LYMPHOCYTES NFR BLD AUTO: 21.8 % (ref 9–44)
MAGNESIUM SERPL-MCNC: 2 MG/DL (ref 1.5–2.5)
MCH RBC QN AUTO: 29.6 PG (ref 27–34)
MCHC RBC AUTO-ENTMCNC: 33.9 % (ref 32–36)
MCV RBC AUTO: 87.4 FL (ref 80–100)
MONOCYTE #: 0.7 TH/MM3 (ref 0–0.9)
MONOCYTES NFR BLD: 6.2 % (ref 0–8)
NEUTROPHILS # BLD AUTO: 7.6 TH/MM3 (ref 1.8–7.7)
NEUTROPHILS NFR BLD AUTO: 66.2 % (ref 16–70)
NITRITE UR QL STRIP: (no result)
PLATELET # BLD: 315 TH/MM3 (ref 150–450)
PMV BLD AUTO: 8.5 FL (ref 7–11)
PROT SERPL-MCNC: 8 GM/DL (ref 6.4–8.2)
RBC # BLD AUTO: 4.77 MIL/MM3 (ref 4–5.3)
SODIUM SERPL-SCNC: 139 MEQ/L (ref 136–145)
SP GR UR STRIP: 1.01 (ref 1–1.03)
SQUAMOUS #/AREA URNS HPF: 4 /HPF (ref 0–5)
TROPONIN I SERPL-MCNC: (no result) NG/ML (ref 0.02–0.05)
URINE LEUKOCYTE ESTERASE: (no result)
WBC # BLD AUTO: 11.4 TH/MM3 (ref 4–11)

## 2018-04-13 PROCEDURE — 71275 CT ANGIOGRAPHY CHEST: CPT

## 2018-04-13 PROCEDURE — 83735 ASSAY OF MAGNESIUM: CPT

## 2018-04-13 PROCEDURE — 82550 ASSAY OF CK (CPK): CPT

## 2018-04-13 PROCEDURE — 81001 URINALYSIS AUTO W/SCOPE: CPT

## 2018-04-13 PROCEDURE — 85025 COMPLETE CBC W/AUTO DIFF WBC: CPT

## 2018-04-13 PROCEDURE — 99285 EMERGENCY DEPT VISIT HI MDM: CPT

## 2018-04-13 PROCEDURE — 82552 ASSAY OF CPK IN BLOOD: CPT

## 2018-04-13 PROCEDURE — 96374 THER/PROPH/DIAG INJ IV PUSH: CPT

## 2018-04-13 PROCEDURE — 87086 URINE CULTURE/COLONY COUNT: CPT

## 2018-04-13 PROCEDURE — 96361 HYDRATE IV INFUSION ADD-ON: CPT

## 2018-04-13 PROCEDURE — 71045 X-RAY EXAM CHEST 1 VIEW: CPT

## 2018-04-13 PROCEDURE — 84703 CHORIONIC GONADOTROPIN ASSAY: CPT

## 2018-04-13 PROCEDURE — 84484 ASSAY OF TROPONIN QUANT: CPT

## 2018-04-13 PROCEDURE — 93005 ELECTROCARDIOGRAM TRACING: CPT

## 2018-04-13 PROCEDURE — 80053 COMPREHEN METABOLIC PANEL: CPT

## 2018-04-13 NOTE — RADRPT
EXAM DATE/TIME:  04/13/2018 20:36 

 

[facility] COMPARISON:     

No previous studies available for comparison.

 

 

INDICATIONS :     

Left sided chest pain.

                      

 

IV CONTRAST:     

74 cc Omnipaque 350 (iohexol) IV 

 

 

RADIATION DOSE:     

10.74 CTDIvol (mGy) 

 

 

MEDICAL HISTORY :     

Cardiovascular disease. Gastroesophageal reflux disease. Lupus.

 

SURGICAL HISTORY :      

None. 

 

ENCOUNTER:      

Initial

 

ACUITY:      

1 day

 

PAIN SCALE:      

7/10

 

LOCATION:       

Left chest 

 

TECHNIQUE:     

Volumetric scanning of the chest was performed using a pulmonary embolism protocol MIP images were re
constructed.  Using automated exposure control and adjustment of the mA and/or kV according to patien
t size, radiation dose was kept as low as reasonably achievable to obtain optimal diagnostic quality 
images.   DICOM format image data is available electronically for review and comparison.  

 

Follow-up recommendations for detected pulmonary nodules are based at a minimum on nodule size and pa
tient risk factors according to Fleischner Society Guidelines.

 

FINDINGS:     

 

PULMONARY ARTERIES:

No filling defects are seen in the pulmonary arteries through the segmental level.

 

LUNGS:     

There is no consolidation or pneumothorax .  No concerning pulmonary nodule is visualized.

 

PLEURAE:     

There is no pleural thickening or pleural effusion.

 

MEDIASTINUM:     

There is good visualization of the great vessels of the middle mediastinum.  No evidence of mediastin
al or hilar adenopathy/mass.

 

MUSCULOSKELETAL:     

Within normal limits for patient age.

 

MISCELLANEOUS:     

The visualized upper abdominal organs demonstrate no acute abnormality.

 

CONCLUSION:     

Normal examination.  

 

 

 

 

 En Mendiola MD on April 13, 2018 at 20:56           

Board Certified Radiologist.

 This report was verified electronically.

## 2018-04-13 NOTE — RADRPT
EXAM DATE/TIME:  04/13/2018 20:22 

 

[facility] COMPARISON:     

No previous studies available for comparison.

 

                     

INDICATIONS :     

Chest pain and shortness of breath.

                     

 

MEDICAL HISTORY :     

Emphysema.       Asthma.   

 

SURGICAL HISTORY :        

Left lumpectomy.

 

ENCOUNTER:     

Initial                                        

 

ACUITY:     

1 day      

 

PAIN SCORE:     

5/10

 

LOCATION:     

Bilateral chest 

 

FINDINGS:     

A single view of the chest demonstrates the lungs to be symmetrically aerated without evidence of mas
s, infiltrate or effusion.  The cardiomediastinal contours are unremarkable.  Osseous structures are 
intact.

 

CONCLUSION:     No acute disease.  

 

 

 

 En Mendiola MD on April 13, 2018 at 20:55           

Board Certified Radiologist.

 This report was verified electronically.

## 2018-04-13 NOTE — PD
HPI


Chief Complaint:  Dizziness


Time Seen by Provider:  19:39


Travel History


International Travel<30 days:  No


Contact w/Intl Traveler<30days:  No


Traveled to known affect area:  No





History of Present Illness


HPI


42-year-old female complains of lightheadedness for 2 days.  No loss of 

consciousness.  She complains of intermittent left-sided chest pain severe 

lasting 5-10 seconds.  Dyspnea accompanies the episodes.  She denies history of 

PE.  She denies any new or different medications.  She denies alcohol tobacco 

use.  Nausea is reported.  Diarrhea is reported.  No vomiting.  No 

genitourinary symptoms.  Patient reports she has been stable/normal state of 

health over the past week or so.





PFSH


Past Medical History


Asthma:  Yes


Autoimmune Disease:  Yes (LUPUS)


Anxiety:  Yes


Depression:  Yes


Heart Rhythm Problems:  No


Cancer:  No


Cardiac Catheterization:  No


Cardiovascular Problems:  Yes (HEART MURMUR)


High Cholesterol:  No


Congestive Heart Failure:  No


Diabetes:  No


Diminished Hearing:  No


Endocrine:  Yes


Gastrointestinal Disorders:  No


GERD:  Yes


Genitourinary:  No


Hepatitis:  No


Hiatal Hernia:  No


Heparin Induced Thrombocytopen:  No


Hypertension:  No


Immune Disorder:  Yes (LUPUS)


Medical other:  Yes (DIVERTICULOSIS)


Musculoskeletal:  Yes (HX OF COSTOCHONDRITIS)


Neurologic:  No


Psychiatric:  Yes (ANXIETY AND PANIC ATTACKS. NO MEDS USED)


Reproductive:  No


Respiratory:  Yes


Immunizations Current:  Yes


Thyroid Disease:  Yes (HYPOTHYROID)


Pregnant?:  Unknown


LMP:  OCT 2018


:  2


Para:  2





Past Surgical History


Abdominal Surgery:  Yes


Body Medical Devices:  N/A


Cardiac Surgery:  No


 Section:  Yes (X2)


Coronary Artery Bypass Graft:  No


Ear Surgery:  No


Endocrine Surgery:  No


Eye Surgery:  No


Genitourinary Surgery:  No


Gynecologic Surgery:  Yes (UTERINE ABLATION)


Joint Replacement:  No


Neurologic Surgery:  No


Oral Surgery:  Yes (CROWNS)


Pacemaker:  No


Thoracic Surgery:  No


Other Surgery:  Yes





Social History


Alcohol Use:  Yes (Socially)


Tobacco Use:  No


Substance Use:  Yes (MARIJUANA ON OCCAS)





Allergies-Medications


(Allergen,Severity, Reaction):  


Coded Allergies:  


     penicillin G (Verified  Allergy, Severe, RASH, 3/28/18)


Reported Meds & Prescriptions





Reported Meds & Active Scripts


Active


Reported


Zoloft (Sertraline HCl) 25 Mg Tab 25 Mg PO DAILY


Levothyroxine (Levothyroxine Sodium) 88 Mcg Tab 100 Mcg PO DAILY








Review of Systems


Except as stated in HPI:  all other systems reviewed are Neg


General / Constitutional:  No: Fever, Chills


Eyes:  No: Photophobia





Physical Exam


Narrative


GENERAL: 43 yo F, WNWD





Vital Signs








  Date Time  Temp Pulse Resp B/P (MAP) Pulse Ox O2 Delivery O2 Flow Rate FiO2


 


18 19:46  67 16 150/84 (106) 99 Room Air  


 


18 15:45 98.0 67 18 157/78 (104) 99   








SKIN: Warm and dry.


HEAD: Atraumatic. Normocephalic. 


EYES: Pupils equal and round. No scleral icterus. No injection or drainage. 


ENT: No nasal bleeding or discharge.  Mucous membranes pink and moist.


NECK: Trachea midline. No JVD. 


CARDIOVASCULAR: Regular rate and rhythm.  


RESPIRATORY: No accessory muscle use. Clear to auscultation. Breath sounds 

equal bilaterally. 


GASTROINTESTINAL: Abdomen soft, non-tender, nondistended. Hepatic and splenic 

margins not palpable. 


MUSCULOSKELETAL: Extremities without clubbing, cyanosis, or edema. No obvious 

deformities. 


NEUROLOGICAL: Awake and alert. No obvious cranial nerve deficits.  Motor 

grossly within normal limits. Five out of 5 muscle strength in the arms and 

legs.  Normal speech.


PSYCHIATRIC: Appropriate mood and affect; insight and judgment normal.





Data


Data


Last Documented VS





Vital Signs








  Date Time  Temp Pulse Resp B/P (MAP) Pulse Ox O2 Delivery O2 Flow Rate FiO2


 


18 20:30  59 18  99 Room Air  


 


18 15:45 98.0       








Orders





 Orders


Electrocardiogram (18 )


Ed Urine Pregnancytest Poc (18 20:00)


Complete Blood Count With Diff (18 20:00)


Comprehensive Metabolic Panel (18 20:00)


Magnesium (Mg) (18 20:00)


Ckmb (Isoenzyme) Profile (18 20:00)


Troponin I (18 20:00)


Urinalysis - C+S If Indicated (18 20:00)


Chest, Single Ap (18 20:00)


Ecg Monitoring (18 20:00)


Iv Access Insert/Monitor (18 20:00)


Oximetry (18 20:00)


Ondansetron Inj (Zofran Inj) (18 20:00)


Sodium Chloride 0.9% Flush (Ns Flush) (18 20:00)


Sodium Chlor 0.9% 1000 Ml Inj (Ns 1000 M (18 20:00)


Ct Pulmonary Angiogram (18 20:16)


Iohexol 350 Inj (Omnipaque 350 Inj) (18 15:34)


CKMB (18 20:04)


CKMB% (18 20:04)


Urine Culture (18 20:53)





Labs





Laboratory Tests








Test


  18


20:04 18


20:53


 


White Blood Count 11.4 TH/MM3  


 


Red Blood Count 4.77 MIL/MM3  


 


Hemoglobin 14.1 GM/DL  


 


Hematocrit 41.7 %  


 


Mean Corpuscular Volume 87.4 FL  


 


Mean Corpuscular Hemoglobin 29.6 PG  


 


Mean Corpuscular Hemoglobin


Concent 33.9 % 


  


 


 


Red Cell Distribution Width 13.3 %  


 


Platelet Count 315 TH/MM3  


 


Mean Platelet Volume 8.5 FL  


 


Neutrophils (%) (Auto) 66.2 %  


 


Lymphocytes (%) (Auto) 21.8 %  


 


Monocytes (%) (Auto) 6.2 %  


 


Eosinophils (%) (Auto) 4.7 %  


 


Basophils (%) (Auto) 1.1 %  


 


Neutrophils # (Auto) 7.6 TH/MM3  


 


Lymphocytes # (Auto) 2.5 TH/MM3  


 


Monocytes # (Auto) 0.7 TH/MM3  


 


Eosinophils # (Auto) 0.5 TH/MM3  


 


Basophils # (Auto) 0.1 TH/MM3  


 


CBC Comment DIFF FINAL  


 


Differential Comment   


 


Blood Urea Nitrogen 13 MG/DL  


 


Creatinine 0.86 MG/DL  


 


Random Glucose 76 MG/DL  


 


Total Protein 8.0 GM/DL  


 


Albumin 3.9 GM/DL  


 


Calcium Level 8.7 MG/DL  


 


Magnesium Level 2.0 MG/DL  


 


Alkaline Phosphatase 107 U/L  


 


Aspartate Amino Transf


(AST/SGOT) 45 U/L 


  


 


 


Alanine Aminotransferase


(ALT/SGPT) 32 U/L 


  


 


 


Total Bilirubin 0.3 MG/DL  


 


Sodium Level 139 MEQ/L  


 


Potassium Level 4.2 MEQ/L  


 


Chloride Level 105 MEQ/L  


 


Carbon Dioxide Level 28.8 MEQ/L  


 


Anion Gap 5 MEQ/L  


 


Estimat Glomerular Filtration


Rate 73 ML/MIN 


  


 


 


Total Creatine Kinase 126 U/L  


 


Creatine Kinase MB 1.1 NG/ML  


 


Troponin I


  LESS THAN 0.02


NG/ML 


 


 


Urine Color  YELLOW 


 


Urine Turbidity  CLEAR 


 


Urine pH  6.5 


 


Urine Specific Gravity  1.012 


 


Urine Protein  NEG mg/dL 


 


Urine Glucose (UA)  NEG mg/dL 


 


Urine Ketones  NEG mg/dL 


 


Urine Occult Blood  MOD 


 


Urine Nitrite  NEG 


 


Urine Bilirubin  NEG 


 


Urine Urobilinogen


  


  LESS THAN 2.0


MG/DL


 


Urine Leukocyte Esterase  NEG 


 


Urine RBC  6 /hpf 


 


Urine WBC


  


  LESS THAN 1


/hpf


 


Urine Squamous Epithelial


Cells 


  4 /hpf 


 


 


Urine Bacteria  MOD /hpf 


 


Microscopic Urinalysis Comment


  


  CULTURE


INDICATED











MDM


Medical Decision Making


Medical Screen Exam Complete:  Yes


Emergency Medical Condition:  Yes


Medical Record Reviewed:  Yes


Differential Diagnosis


Electrolyte imbalance, anemia, PE, DVT, arrhythmia, thyroid disease, anxiety


Narrative Course


EKG shows sinus rhythm rate 64 normal axis and intervals nonspecific ST changes


CBC & BMP Diagram


18 20:04








Total Protein 8.0, Albumin 3.9, Calcium Level 8.7, Magnesium Level 2.0, 

Alkaline Phosphatase 107, Aspartate Amino Transf (AST/SGOT) 45 H, Alanine 

Aminotransferase (ALT/SGPT) 32, Total Bilirubin 0.3





Troponin is undetectable





Last Impressions








CT Angiography 18 Signed





Impressions: 





 Service Date/Time:  2018 20:36 - CONCLUSION:  Normal 





 examination.        En Mendiola MD 


 


Chest X-Ray 18 Signed





Impressions: 





 Service Date/Time:  2018 20:22 - CONCLUSION: No acute 

disease. 





       En Mendiola MD 





The patient is resting comfortably and feels better, is alert and in no 

distress. The patients results and examination findings were discussed.  The 

repeat examination is unremarkable and benign. The history, exam, diagnostic 

testing, and current condition do not suggest any significant pathology to 

warrant further testing, continued ED treatment, admission, or surgical 

evaluation at this point. The vital signs have been stable. The patient does 

not have uncontrollable pain, intractable vomiting, or other significant 

symptoms. The patient's condition is stable and appropriate for discharge. The 

patient will pursue further outpatient evaluation with a primary care physician 

or other designated or consulting physician as indicated in the discharge 

instructions. The patient expressed understanding and was agreeable with this 

plan.





Diagnosis





 Primary Impression:  


 Exhaustion


 Additional Impressions:  


 Chest pain


 Dyspnea


 Headache


 Difficulty concentrating


 Lightheadedness


Referrals:  


Ruby Ko MD


call for appointment


***Med/Other Pt SpecificInfo:  No Change to Meds


Disposition:  01 DISCHARGE HOME


Condition:  Stable











Hector Amaro MD 2018 20:16

## 2018-04-14 NOTE — EKG
Date Performed: 04/13/2018       Time Performed: 16:06:57

 

PTAGE:      42 years

 

EKG:      Sinus rhythm 

 

 POSSIBLE LEFT ATRIAL ENLARGEMENT BORDERLINE LEFT AXIS DEVIATION Since the 

 

 PREVIOUS TRACING            , no significant change noted BORDERLINE ECG  PREVIOUS TRACING 01/12/201
5 @ 11.31.28

 

DOCTOR:   Marlyn Ledesma  Interpretating Date/Time  04/14/2018 16:42:41